# Patient Record
Sex: FEMALE | Race: BLACK OR AFRICAN AMERICAN | Employment: UNEMPLOYED | URBAN - METROPOLITAN AREA
[De-identification: names, ages, dates, MRNs, and addresses within clinical notes are randomized per-mention and may not be internally consistent; named-entity substitution may affect disease eponyms.]

---

## 2018-06-06 LAB — MAMMOGRAPHY, EXTERNAL: NORMAL

## 2019-11-23 LAB
HBA1C MFR BLD HPLC: 7.5 %
LDL-C, EXTERNAL: 180

## 2020-01-13 ENCOUNTER — OFFICE VISIT (OUTPATIENT)
Dept: FAMILY MEDICINE CLINIC | Age: 50
End: 2020-01-13

## 2020-01-13 VITALS
HEART RATE: 77 BPM | SYSTOLIC BLOOD PRESSURE: 121 MMHG | BODY MASS INDEX: 35.82 KG/M2 | OXYGEN SATURATION: 96 % | HEIGHT: 65 IN | DIASTOLIC BLOOD PRESSURE: 87 MMHG | WEIGHT: 215 LBS | RESPIRATION RATE: 16 BRPM | TEMPERATURE: 98 F

## 2020-01-13 DIAGNOSIS — G89.29 OTHER CHRONIC PAIN: ICD-10-CM

## 2020-01-13 DIAGNOSIS — I10 ESSENTIAL HYPERTENSION: ICD-10-CM

## 2020-01-13 DIAGNOSIS — Z12.31 VISIT FOR SCREENING MAMMOGRAM: ICD-10-CM

## 2020-01-13 DIAGNOSIS — E11.9 TYPE 2 DIABETES MELLITUS WITHOUT COMPLICATION, WITHOUT LONG-TERM CURRENT USE OF INSULIN (HCC): Primary | ICD-10-CM

## 2020-01-13 DIAGNOSIS — E66.01 SEVERE OBESITY (HCC): ICD-10-CM

## 2020-01-13 RX ORDER — ROSUVASTATIN CALCIUM 20 MG/1
20 TABLET, COATED ORAL
COMMUNITY
End: 2020-02-11 | Stop reason: SDUPTHER

## 2020-01-13 RX ORDER — ESCITALOPRAM OXALATE 20 MG/1
20 TABLET ORAL DAILY
COMMUNITY
End: 2020-07-14 | Stop reason: SDUPTHER

## 2020-01-13 RX ORDER — LISINOPRIL AND HYDROCHLOROTHIAZIDE 20; 25 MG/1; MG/1
TABLET ORAL DAILY
COMMUNITY
End: 2020-12-03 | Stop reason: SDUPTHER

## 2020-01-13 NOTE — PATIENT INSTRUCTIONS
Body Mass Index: Care Instructions  Your Care Instructions    Body mass index (BMI) can help you see if your weight is raising your risk for health problems. It uses a formula to compare how much you weigh with how tall you are. · A BMI lower than 18.5 is considered underweight. · A BMI between 18.5 and 24.9 is considered healthy. · A BMI between 25 and 29.9 is considered overweight. A BMI of 30 or higher is considered obese. If your BMI is in the normal range, it means that you have a lower risk for weight-related health problems. If your BMI is in the overweight or obese range, you may be at increased risk for weight-related health problems, such as high blood pressure, heart disease, stroke, arthritis or joint pain, and diabetes. If your BMI is in the underweight range, you may be at increased risk for health problems such as fatigue, lower protection (immunity) against illness, muscle loss, bone loss, hair loss, and hormone problems. BMI is just one measure of your risk for weight-related health problems. You may be at higher risk for health problems if you are not active, you eat an unhealthy diet, or you drink too much alcohol or use tobacco products. Follow-up care is a key part of your treatment and safety. Be sure to make and go to all appointments, and call your doctor if you are having problems. It's also a good idea to know your test results and keep a list of the medicines you take. How can you care for yourself at home? · Practice healthy eating habits. This includes eating plenty of fruits, vegetables, whole grains, lean protein, and low-fat dairy. · If your doctor recommends it, get more exercise. Walking is a good choice. Bit by bit, increase the amount you walk every day. Try for at least 30 minutes on most days of the week. · Do not smoke. Smoking can increase your risk for health problems. If you need help quitting, talk to your doctor about stop-smoking programs and medicines. These can increase your chances of quitting for good. · Limit alcohol to 2 drinks a day for men and 1 drink a day for women. Too much alcohol can cause health problems. If you have a BMI higher than 25  · Your doctor may do other tests to check your risk for weight-related health problems. This may include measuring the distance around your waist. A waist measurement of more than 40 inches in men or 35 inches in women can increase the risk of weight-related health problems. · Talk with your doctor about steps you can take to stay healthy or improve your health. You may need to make lifestyle changes to lose weight and stay healthy, such as changing your diet and getting regular exercise. If you have a BMI lower than 18.5  · Your doctor may do other tests to check your risk for health problems. · Talk with your doctor about steps you can take to stay healthy or improve your health. You may need to make lifestyle changes to gain or maintain weight and stay healthy, such as getting more healthy foods in your diet and doing exercises to build muscle. Where can you learn more? Go to http://russell-alli.info/. Enter S176 in the search box to learn more about \"Body Mass Index: Care Instructions. \"  Current as of: October 13, 2016  Content Version: 11.4  © 4702-0786 Healthwise, [employer]. Care instructions adapted under license by [employer] (which disclaims liability or warranty for this information). If you have questions about a medical condition or this instruction, always ask your healthcare professional. [address] any warranty or liability for your use of this information.

## 2020-01-13 NOTE — PROGRESS NOTES
Chief Complaint   Patient presents with   [facility]     Pt reports that she has chronic abdominal pain from previous hernia surgeries with mesh that did not go well. Pt reports that when she was taking off her pain medication by another provider she started smoking weed. Pt reports that she had her medical marijuana card in [place]. Pt reports that it provides significant relief. Pt reports that she has a history of blood clots in her neck from a port a cath. Pt has diabetes, high blood pressure. Pt has labs done last month at Dr. Kourtney Holliday office. Subjective: (As above and below)     Chief Complaint   Patient presents with   [address]     she is a 47y.o. year old female who presents for evaluation. Reviewed PmHx, RxHx, FmHx, SocHx, AllgHx and updated in chart. Review of Systems - negative except as listed above    Objective:     Vitals:    01/13/20 1436   BP: 121/87   Pulse: 77   Resp: 16   Temp: 98 °F (36.7 °C)   TempSrc: Oral   SpO2: 96%   Weight: 215 lb (97.5 kg)   Height: 5' 5\" (1.651 m)     Physical Examination: General appearance - alert, well appearing, and in no distress  Mental status - normal mood, behavior, speech, dress, motor activity, and thought processes  Mouth - mucous membranes moist, pharynx normal without lesions  Chest - clear to auscultation, no wheezes, rales or rhonchi, symmetric air entry  Heart - normal rate, regular rhythm, normal S1, S2, no murmurs, rubs, clicks or gallops  Musculoskeletal - no joint tenderness, deformity or swelling  Extremities - peripheral pulses normal, no pedal edema, no clubbing or cyanosis    Assessment/ Plan:   1. Severe obesity ([address]  -continue to work on diet and exercise    2. Type 2 diabetes mellitus without complication, without long-term current use of insulin ([facility])  - glucose blood VI test strips (TRUE METRIX GLUCOSE TEST STRIP) strip; Please check blood sugar once daily, E11.9  Dispense: 100 Strip; Refill: 11    3. Essential hypertension  -well controlled    4. Other chronic pain  -managed by marijuana and gabapentin    5. Visit for screening mammogram  - [facility] 3D ZEN W MAMMO BI SCREENING INCL CAD; Future       I have discussed the diagnosis with the patient and the intended plan as seen in the above orders. The patient has received an after-visit summary and questions were answered concerning future plans. Medication Side Effects and Warnings were discussed with patient: yes  Patient Labs were reviewed: yes  Patient Past Records were reviewed:  yes    Roman Melara M.D. Discussed the patient's BMI with her. The BMI follow up plan is as follows:     dietary management education, guidance, and counseling  encourage exercise  monitor weight  prescribed dietary intake    An After Visit Summary was printed and given to the patient.

## 2020-01-13 NOTE — PROGRESS NOTES
Chief Complaint   Patient presents with   [facility] Maintenance reviewed     1. Have you been to the ER, urgent care clinic since your last visit? Hospitalized since your last visit? 2. Have you seen or consulted any other health care providers outside of the [address] [place] [address] since your last visit? Include any pap smears or colon screening.

## 2020-01-13 NOTE — PROGRESS NOTES
Chief Complaint   Patient presents with   [facility]     1. Have you been to the ER, urgent care clinic since your last visit? Hospitalized since your last visit? Yes When: [facility] 12-21    2. Have you seen or consulted any other health care providers outside of the [address] [place] [address] since your last visit? Include any pap smears or colon screening.  No    Health Maintenance Due   Topic Date Due    Pneumococcal 0-64 years (1 of 1 - PPSV23) 08/08/1975    DTaP/Tdap/Td series (1 - Tdap) 08/08/1980    PAP AKA CERVICAL CYTOLOGY  08/08/1990    Shingrix Vaccine Age 50> (1 of 2) 08/08/2019    BREAST CANCER SCRN MAMMOGRAM  08/08/2019    FOBT Q 1 YEAR AGE 50-75  08/08/2019

## 2020-01-18 DIAGNOSIS — N64.4 BREAST PAIN: ICD-10-CM

## 2020-01-18 DIAGNOSIS — Z12.31 VISIT FOR SCREENING MAMMOGRAM: Primary | ICD-10-CM

## 2020-01-27 ENCOUNTER — HOSPITAL ENCOUNTER (OUTPATIENT)
Dept: MAMMOGRAPHY | Age: 50
Discharge: HOME | End: 2020-01-27
Attending: FAMILY MEDICINE

## 2020-01-27 DIAGNOSIS — N64.4 BREAST PAIN: ICD-10-CM

## 2020-01-27 DIAGNOSIS — Z12.31 VISIT FOR SCREENING MAMMOGRAM: ICD-10-CM

## 2020-02-11 RX ORDER — ROSUVASTATIN CALCIUM 20 MG/1
20 TABLET, COATED ORAL
Qty: 90 TAB | Refills: 3 | Status: SHIPPED | OUTPATIENT
Start: 2020-02-11 | End: 2020-02-18 | Stop reason: SDUPTHER

## 2020-02-18 RX ORDER — ROSUVASTATIN CALCIUM 20 MG/1
20 TABLET, COATED ORAL
Qty: 90 TAB | Refills: 3 | Status: SHIPPED | OUTPATIENT
Start: 2020-02-18 | End: 2021-08-30

## 2020-02-18 NOTE — TELEPHONE ENCOUNTER
Pt is calling requesting a refill on her med    Requested Prescriptions     Pending Prescriptions Disp Refills    rosuvastatin (CRESTOR) 20 mg tablet 90 Tab 3     Sig: Take 1 Tab by mouth nightly.

## 2020-02-25 ENCOUNTER — TELEPHONE (OUTPATIENT)
Dept: FAMILY MEDICINE CLINIC | Age: 50
End: 2020-02-25

## 2020-02-25 NOTE — TELEPHONE ENCOUNTER
Johnny Burkett [phone]     Dx mammogram scheduled but pt has been experiencing all over pain for a year now and she states that dx mammogram is not going to reveal anything new. She would like to know if it is okay for pt to have screening mammogram done instead? Called and informed Carlos Cummins that per Verbal order by Dr. Ana Smith she states it is fine to do this.

## 2020-02-26 ENCOUNTER — HOSPITAL ENCOUNTER (OUTPATIENT)
Dept: MAMMOGRAPHY | Age: 50
Discharge: HOME | End: 2020-02-26
Attending: FAMILY MEDICINE
Payer: MEDICAID

## 2020-02-26 DIAGNOSIS — Z12.31 VISIT FOR SCREENING MAMMOGRAM: ICD-10-CM

## 2020-02-26 PROCEDURE — 77063 BREAST TOMOSYNTHESIS BI: CPT

## 2020-03-06 DIAGNOSIS — G89.29 OTHER CHRONIC PAIN: Primary | ICD-10-CM

## 2020-03-06 NOTE — TELEPHONE ENCOUNTER
Requested Prescriptions     Pending Prescriptions Disp Refills    gabapentin 300 mg Tb24       Sig: Take  by mouth.      [facility]

## 2020-03-30 ENCOUNTER — DOCUMENTATION ONLY (OUTPATIENT)
Dept: FAMILY MEDICINE CLINIC | Age: 50
End: 2020-03-30

## 2020-03-30 NOTE — PROGRESS NOTES
Pharmacy called requesting rx for gabapentin to be changed from tabs to caps. Per Dr. Seaman Friday given.

## 2020-03-30 NOTE — TELEPHONE ENCOUNTER
See (on disclosure) calling in for refill to go to [facility]. She is down to 3 days worth of med. Leslee Long can be reached at [phone].  patient can be reached at [phone]

## 2020-03-31 RX ORDER — METFORMIN HYDROCHLORIDE 500 MG/1
1000 TABLET, EXTENDED RELEASE ORAL 2 TIMES DAILY
Qty: 120 TAB | Refills: 10 | Status: SHIPPED | OUTPATIENT
Start: 2020-03-31 | End: 2021-07-13

## 2020-05-07 ENCOUNTER — VIRTUAL VISIT (OUTPATIENT)
Dept: FAMILY MEDICINE CLINIC | Age: 50
End: 2020-05-07

## 2020-05-07 VITALS — HEIGHT: 65 IN | BODY MASS INDEX: 36.65 KG/M2 | WEIGHT: 220 LBS

## 2020-05-07 DIAGNOSIS — E66.01 SEVERE OBESITY (HCC): ICD-10-CM

## 2020-05-07 DIAGNOSIS — E11.40 TYPE 2 DIABETES MELLITUS WITH DIABETIC NEUROPATHY, WITHOUT LONG-TERM CURRENT USE OF INSULIN (HCC): Primary | ICD-10-CM

## 2020-05-07 DIAGNOSIS — I10 ESSENTIAL HYPERTENSION: ICD-10-CM

## 2020-05-07 RX ORDER — GABAPENTIN 600 MG/1
600 TABLET ORAL 3 TIMES DAILY
Qty: 90 TAB | Refills: 2 | Status: SHIPPED | OUTPATIENT
Start: 2020-05-07 | End: 2020-10-11

## 2020-05-07 RX ORDER — PANTOPRAZOLE SODIUM 20 MG/1
20 TABLET, DELAYED RELEASE ORAL DAILY
COMMUNITY
End: 2021-11-16 | Stop reason: SDUPTHER

## 2020-05-07 RX ORDER — SUCRALFATE 1 G/1
1 TABLET ORAL 4 TIMES DAILY
COMMUNITY
End: 2021-11-16 | Stop reason: SDUPTHER

## 2020-05-07 NOTE — PROGRESS NOTES
Chief Complaint   Patient presents with    Diabetes     follow up    Medication Evaluation     gabapentin       1. Have you been to the ER, urgent care clinic since your last visit? Hospitalized since your last visit? [facility] [place]     2. Have you seen or consulted any other health care providers outside of the [address] [place] [address] since your last visit? Include any pap smears or colon screening.  Dr. Dorothy De, gastro-[facility]    Health Maintenance Due   Topic Date Due    Foot Exam Q1  08/08/1979    Eye Exam Retinal or Dilated  08/08/1979    PAP AKA CERVICAL CYTOLOGY  08/08/1990    FOBT Q1Y Age 50-75  08/08/2019    Shingrix Vaccine Age 50> (2 of 2) 01/03/2020

## 2020-05-07 NOTE — PROGRESS NOTES
Chief Complaint   Patient presents with    Diabetes     follow up    Medication Evaluation     gabapentin     Pt was seen today via [employer] video platform. Pt reports that she has been doing well, still struggling with chronic abdominal pain. Pt was seen in the ER, she has been working with a GI doctor as well. Pt reports that she used to be on a higher dose of gabapentin, has noticed increased pain since her dose was decreased. Pt reports that her blood sugars have been doing well, taking all of her medications as written and avoiding sweets. [facility] [address] [place] [address] for lab work. Tomas Miller is a 47 y.o. female who was seen by synchronous (real-time) audio-video technology on 5/7/2020. Consent: Tomas Miller, who was seen by synchronous (real-time) audio-video technology, and/or her healthcare decision maker, is aware that this patient-initiated, Telehealth encounter on 5/7/2020 is a billable service, with coverage as determined by her insurance carrier. She is aware that she may receive a bill and has provided verbal consent to proceed: Yes. Assessment & Plan:   1. Type 2 diabetes mellitus with diabetic neuropathy, without long-term current use of insulin (HCC)  -increase gabapentin to previous dose.   - gabapentin (NEURONTIN) 600 mg tablet; Take 1 Tab by mouth three (3) times daily for 30 days. Max Daily Amount: 1,800 mg. Dispense: 90 Tab; Refill: 2  - METABOLIC PANEL, COMPREHENSIVE; Future  - LIPID PANEL; Future  - HEMOGLOBIN A1C WITH EAG; Future    2. Essential hypertension  -no symptoms, continue on current medications,   - CBC W/O DIFF; Future  - TSH 3RD GENERATION; Future    3. Obesity  -reviewed the need to decrease sweets, pt agreed, reports that she is trying to walk more.        Subjective:   Tomas Miller is a 47 y.o. female who was seen for Diabetes (follow up) and Medication Evaluation (gabapentin)      Prior to Admission medications    Medication Sig Start Date End Date Taking? Authorizing Provider   sucralfate (CARAFATE) 1 gram tablet Take 1 g by mouth four (4) times daily. Yes Provider, Historical   pantoprazole (PROTONIX) 20 mg tablet Take 20 mg by mouth daily. Yes Provider, Historical   gabapentin (NEURONTIN) 600 mg tablet Take 1 Tab by mouth three (3) times daily for 30 days. Max Daily Amount: 1,800 mg. 5/7/20 6/6/20 Yes Andrew Bains MD   metFORMIN ER (GLUCOPHAGE XR) 500 mg tablet Take 2 Tabs by mouth two (2) times a day. 3/31/20  Yes Andrew Bains MD   rosuvastatin (CRESTOR) 20 mg tablet Take 1 Tab by mouth nightly. 2/18/20  Yes Andrew Bains MD   escitalopram oxalate (LEXAPRO) 20 mg tablet Take 20 mg by mouth daily. Yes Provider, Historical   lisinopril-hydroCHLOROthiazide (PRINZIDE, ZESTORETIC) 20-25 mg per tablet Take  by mouth daily. Yes Provider, Historical   glucose blood VI test strips (TRUE METRIX GLUCOSE TEST STRIP) strip Please check blood sugar once daily, E11.9 1/13/20  Yes Andrew Bains MD   gabapentin 300 mg Tb24 Take 300 mg by mouth daily.  Max Daily Amount: 300 mg. 3/6/20 5/7/20  Andrew Bains MD     No Known Allergies    Patient Active Problem List   Diagnosis Code    Severe obesity ([facility] E66.01    Hypertension I10    Diabetes ([facility] E11.9    Chronic pain G89.29       ROS    Objective:   Vital Signs: (As obtained by patient/caregiver at home)  Visit Vitals  Ht 5' 5\" (1.651 m)   Wt 220 lb (99.8 kg)   LMP 02/12/2012   BMI 36.61 kg/m²        [INSTRUCTIONS:  \"[x]\" Indicates a positive item  \"[]\" Indicates a negative item  -- DELETE ALL ITEMS NOT EXAMINED]    Constitutional: [x] Appears well-developed and well-nourished [x] No apparent distress      [] Abnormal -     Mental status: [x] Alert and awake  [x] Oriented to person/place/time [x] Able to follow commands    [] Abnormal -     Eyes:   EOM    [x]  Normal    [] Abnormal -   Sclera  [x]  Normal [] Abnormal -          Discharge [x]  None visible   [] Abnormal -     HENT: [x] Normocephalic, atraumatic  [] Abnormal -   [x] Mouth/Throat: Mucous membranes are moist    External Ears [x] Normal  [] Abnormal -    Neck: [x] No visualized mass [] Abnormal -     Pulmonary/Chest: [x] Respiratory effort normal   [x] No visualized signs of difficulty breathing or respiratory distress        [] Abnormal -      Musculoskeletal:   [x] Normal gait with no signs of ataxia         [x] Normal range of motion of neck        [] Abnormal -     Neurological:        [x] No Facial Asymmetry (Cranial nerve 7 motor function) (limited exam due to video visit)          [x] No gaze palsy        [] Abnormal -          Skin:        [x] No significant exanthematous lesions or discoloration noted on facial skin         [] Abnormal -            Psychiatric:       [x] Normal Affect [] Abnormal -        [x] No Hallucinations    Other pertinent observable physical exam findings:-        We discussed the expected course, resolution and complications of the diagnosis(es) in detail. Medication risks, benefits, costs, interactions, and alternatives were discussed as indicated. I advised her to contact the office if her condition worsens, changes or fails to improve as anticipated. She expressed understanding with the diagnosis(es) and plan. Jasvir Cueto is a 47 y.o. female who was evaluated by a video visit encounter for concerns as above. Patient identification was verified prior to start of the visit. A caregiver was present when appropriate. Due to this being a TeleHealth encounter (During [facility]- public health emergency), evaluation of the following organ systems was limited: Vitals/Constitutional/EENT/Resp/CV/GI//MS/Neuro/Skin/Heme-Lymph-Imm.   Pursuant to the emergency declaration under the [facility], [address] waiver authority and the [employer] Appropri[employer] Act, this Virtual  Visit was conducted, with patient's (and/or legal guardian's) consent, to reduce the patient's risk of exposure to COVID-19 and provide necessary medical care. Services were provided through a video synchronous discussion virtually to substitute for in-person clinic visit. Patient and provider were located at their individual homes.       Michael Pena MD

## 2020-05-09 LAB
ALBUMIN SERPL-MCNC: 4.6 G/DL (ref 3.8–4.8)
ALBUMIN/GLOB SERPL: 2 {RATIO} (ref 1.2–2.2)
ALP SERPL-CCNC: 74 IU/L (ref 39–117)
ALT SERPL-CCNC: 35 IU/L (ref 0–32)
AST SERPL-CCNC: 22 IU/L (ref 0–40)
BILIRUB SERPL-MCNC: 0.4 MG/DL (ref 0–1.2)
BUN SERPL-MCNC: 9 MG/DL (ref 6–24)
BUN/CREAT SERPL: 11 (ref 9–23)
CALCIUM SERPL-MCNC: 9.7 MG/DL (ref 8.7–10.2)
CHLORIDE SERPL-SCNC: 100 MMOL/L (ref 96–106)
CHOLEST SERPL-MCNC: 165 MG/DL (ref 100–199)
CO2 SERPL-SCNC: 25 MMOL/L (ref 20–29)
CREAT SERPL-MCNC: 0.79 MG/DL (ref 0.57–1)
ERYTHROCYTE [DISTWIDTH] IN BLOOD BY AUTOMATED COUNT: 12.6 % (ref 11.7–15.4)
EST. AVERAGE GLUCOSE BLD GHB EST-MCNC: 226 MG/DL
GLOBULIN SER CALC-MCNC: 2.3 G/DL (ref 1.5–4.5)
GLUCOSE SERPL-MCNC: 221 MG/DL (ref 65–99)
HBA1C MFR BLD: 9.5 % (ref 4.8–5.6)
HCT VFR BLD AUTO: 39 % (ref 34–46.6)
HDLC SERPL-MCNC: 36 MG/DL
HGB BLD-MCNC: 13.2 G/DL (ref 11.1–15.9)
INTERPRETATION, 910389: NORMAL
LDLC SERPL CALC-MCNC: 94 MG/DL (ref 0–99)
Lab: NORMAL
MCH RBC QN AUTO: 29.1 PG (ref 26.6–33)
MCHC RBC AUTO-ENTMCNC: 33.8 G/DL (ref 31.5–35.7)
MCV RBC AUTO: 86 FL (ref 79–97)
PLATELET # BLD AUTO: 232 X10E3/UL (ref 150–450)
POTASSIUM SERPL-SCNC: 3.9 MMOL/L (ref 3.5–5.2)
PROT SERPL-MCNC: 6.9 G/DL (ref 6–8.5)
RBC # BLD AUTO: 4.53 X10E6/UL (ref 3.77–5.28)
SODIUM SERPL-SCNC: 141 MMOL/L (ref 134–144)
TRIGL SERPL-MCNC: 176 MG/DL (ref 0–149)
TSH SERPL DL<=0.005 MIU/L-ACNC: 1.19 UIU/ML (ref 0.45–4.5)
VLDLC SERPL CALC-MCNC: 35 MG/DL (ref 5–40)
WBC # BLD AUTO: 8.4 X10E3/UL (ref 3.4–10.8)

## 2020-05-11 RX ORDER — GLIPIZIDE 5 MG/1
5 TABLET ORAL 2 TIMES DAILY
Qty: 180 TAB | Refills: 1 | Status: SHIPPED | OUTPATIENT
Start: 2020-05-11 | End: 2020-08-31

## 2020-05-11 NOTE — PROGRESS NOTES
Called pt, verified name and . Informed pt that per Dr. Eliza Perez cholesterol came back looking pretty good. Your triglycerides are slightly elevated. Blood sugars have significantly increased. Recommend adding additional medication. All other labs are within normal limits. Pt stated understanding and agrees to starting additional med. Patricia Krishnamurthy

## 2020-05-11 NOTE — PROGRESS NOTES
Your cholesterol came back looking pretty good. Your triglycerides are slightly elevated. The best way to bring that number down is to incorporate more omega 3's into your diet. Here are some suggestions on how to do that:  - eat 2-3 serving of fish like salmon and trout per week  - eat lots of fresh dark leafy green vegetables  - incorporate more garlic into your diet  Blood sugars have significantly increased. Recommend adding additional medication, please advise if pt agrees. All other labs are within normal limits. Please inform.

## 2020-07-14 NOTE — TELEPHONE ENCOUNTER
[facility] is requesting a refill on med    Requested Prescriptions     Pending Prescriptions Disp Refills    escitalopram oxalate (Lexapro) 20 mg tablet        Sig: Take 1 Tab by mouth daily.

## 2020-07-15 RX ORDER — ESCITALOPRAM OXALATE 20 MG/1
20 TABLET ORAL DAILY
Qty: 90 TAB | Refills: 1 | Status: SHIPPED | OUTPATIENT
Start: 2020-07-15 | End: 2021-05-03

## 2020-08-31 RX ORDER — GLIPIZIDE 5 MG/1
TABLET ORAL
Qty: 180 TAB | Refills: 0 | Status: SHIPPED | OUTPATIENT
Start: 2020-08-31 | End: 2021-08-16 | Stop reason: SDUPTHER

## 2020-12-08 ENCOUNTER — VIRTUAL VISIT (OUTPATIENT)
Dept: FAMILY MEDICINE CLINIC | Age: 50
End: 2020-12-08
Payer: MEDICAID

## 2020-12-08 DIAGNOSIS — R05.9 COUGH: Primary | ICD-10-CM

## 2020-12-08 PROCEDURE — 99213 OFFICE O/P EST LOW 20 MIN: CPT | Performed by: FAMILY MEDICINE

## 2020-12-08 NOTE — PROGRESS NOTES
1. Have you been to the ER, urgent care clinic since your last visit? Hospitalized since your last visit? No    2. Have you seen or consulted any other health care providers outside of the [address] [place] [address] since your last visit? Include any pap smears or colon screening.  No     Health Maintenance Due   Topic Date Due    Foot Exam Q1  08/08/1979    Eye Exam Retinal or Dilated  08/08/1979    PAP AKA CERVICAL CYTOLOGY  08/08/1990    Colorectal Cancer Screening Combo  08/08/2019    Shingrix Vaccine Age 50> (2 of 2) 01/03/2020    A1C test (Diabetic or Prediabetic)  08/08/2020    Flu Vaccine (1) 09/01/2020    MICROALBUMIN Q1  11/23/2020

## 2020-12-08 NOTE — PROGRESS NOTES
Chief Complaint   Patient presents with    Medication Evaluation    Concern For COVID-19 (Coronavirus)     Pt was evaluated via video visit. Patient reports that her daughter tested positive for COVID, daughter was sent home from school and was rapid positive at urgent care last night. Patient reports that she has some cough, and fatigue. Patient has had close contact with her daughter. No shortness of breath, no chills, no fever, no change in taste or smell. Bisi Boo is a 45 y.o. female who was seen by synchronous (real-time) audio-video technology on 12/8/2020 for Medication Evaluation and Concern For COVID-19 (Coronavirus)        Assessment & Plan:   Diagnoses and all orders for this visit:    1. Cough  -     NOVEL CORONAVIRUS (COVID-19)    Patient will be coming up to the office with her other family members for car side testing due to close exposure to positive patient. Reinforced the need for isolation, health department has already been in touch with patient regarding sick family member. Reviewed reasons to seek further medical attention and/or to notify our office immediate    Subjective:       Prior to Admission medications    Medication Sig Start Date End Date Taking? Authorizing Provider   lisinopril-hydroCHLOROthiazide (PRINZIDE, ZESTORETIC) 20-25 mg per tablet Take 1 Tab by mouth daily. 12/4/20  Yes David Bains MD   gabapentin (NEURONTIN) 600 mg tablet TAKE 1 TABLET BY MOUTH THREE TIMES DAILY FOR 30 DAYS. MAX DAILY AMOUNT: 1,800 MG. 10/11/20  Yes David Bains MD   glipiZIDE (GLUCOTROL) 5 mg tablet TAKE ONE TABLET BY MOUTH TWICE A DAY 8/31/20  Yes David Bains MD   escitalopram oxalate (Lexapro) 20 mg tablet Take 1 Tab by mouth daily. 7/15/20  Yes David Bains MD   pantoprazole (PROTONIX) 20 mg tablet Take 20 mg by mouth daily.    Yes Provider, Historical   metFORMIN ER (GLUCOPHAGE XR) 500 mg tablet Take 2 Tabs by mouth two (2) times a day. 3/31/20  Yes Joel Bains MD   rosuvastatin (CRESTOR) 20 mg tablet Take 1 Tab by mouth nightly. 2/18/20  Yes Joel Bains MD   glucose blood VI test strips (TRUE METRIX GLUCOSE TEST STRIP) strip Please check blood sugar once daily, E11.9 1/13/20  Yes Joel Bains MD   sucralfate (CARAFATE) 1 gram tablet Take 1 g by mouth four (4) times daily. Provider, Historical     Patient Active Problem List   Diagnosis Code    Severe obesity ([facility] E66.01    Hypertension I10    Diabetes ([facility] E11.9    Chronic pain G89.29       Review of Systems   Constitutional: Positive for malaise/fatigue. Negative for chills and fever. HENT: Negative for congestion and sore throat. Respiratory: Positive for cough. Negative for shortness of breath. Cardiovascular: Negative for chest pain and palpitations. Gastrointestinal: Negative for abdominal pain, heartburn, nausea and vomiting. Genitourinary: Negative for dysuria and urgency. Musculoskeletal: Negative for joint pain and myalgias. Neurological: Negative for dizziness, tingling and headaches. All other systems reviewed and are negative. Objective:   No flowsheet data found.      [INSTRUCTIONS:  \"[x]\" Indicates a positive item  \"[]\" Indicates a negative item  -- DELETE ALL ITEMS NOT EXAMINED]    Constitutional: [x] Appears well-developed and well-nourished [x] No apparent distress      [] Abnormal -     Mental status: [x] Alert and awake  [x] Oriented to person/place/time [x] Able to follow commands    [] Abnormal -     Eyes:   EOM    [x]  Normal    [] Abnormal -   Sclera  [x]  Normal    [] Abnormal -          Discharge [x]  None visible   [] Abnormal -     HENT: [x] Normocephalic, atraumatic  [] Abnormal -   [x] Mouth/Throat: Mucous membranes are moist    External Ears [x] Normal  [] Abnormal -    Neck: [x] No visualized mass [] Abnormal -     Pulmonary/Chest: [x] Respiratory effort normal   [x] No visualized signs of difficulty breathing or respiratory distress        [] Abnormal -      Musculoskeletal:   [x] Normal gait with no signs of ataxia         [x] Normal range of motion of neck        [] Abnormal -     Neurological:        [x] No Facial Asymmetry (Cranial nerve 7 motor function) (limited exam due to video visit)          [x] No gaze palsy        [] Abnormal -          Skin:        [x] No significant exanthematous lesions or discoloration noted on facial skin         [] Abnormal -            Psychiatric:       [x] Normal Affect [] Abnormal -        [x] No Hallucinations    Other pertinent observable physical exam findings:-        We discussed the expected course, resolution and complications of the diagnosis(es) in detail. Medication risks, benefits, costs, interactions, and alternatives were discussed as indicated. I advised her to contact the office if her condition worsens, changes or fails to improve as anticipated. She expressed understanding with the diagnosis(es) and plan. Tino Kim, who was evaluated through a patient-initiated, synchronous (real-time) audio-video encounter, and/or her healthcare decision maker, is aware that it is a billable service, with coverage as determined by her insurance carrier. She provided verbal consent to proceed: Yes, and patient identification was verified. It was conducted pursuant to the emergency declaration under the [facility], [address] [place] [address] authority and the [employer] Act. A caregiver was present when appropriate. Ability to conduct physical exam was limited. I was in the office. The patient was at home.       Vonnie Welch MD

## 2020-12-10 DIAGNOSIS — E11.40 TYPE 2 DIABETES MELLITUS WITH DIABETIC NEUROPATHY, WITHOUT LONG-TERM CURRENT USE OF INSULIN (HCC): ICD-10-CM

## 2020-12-10 LAB — SARS-COV-2, NAA: NOT DETECTED

## 2020-12-10 RX ORDER — GABAPENTIN 600 MG/1
TABLET ORAL
Qty: 90 TAB | Refills: 2 | Status: SHIPPED | OUTPATIENT
Start: 2020-12-10 | End: 2021-07-13

## 2020-12-10 NOTE — TELEPHONE ENCOUNTER
Patient hasn't received her Gabapentin yet from Dr. Kelly Olvera. She already had a virtual visit with Dr. Kelly Olvera. Patient is requesting for another provider can prescribe this. She needs asap.

## 2021-05-03 RX ORDER — ESCITALOPRAM OXALATE 20 MG/1
20 TABLET ORAL DAILY
Qty: 90 TAB | Refills: 0 | Status: SHIPPED | OUTPATIENT
Start: 2021-05-03 | End: 2021-10-01

## 2021-08-16 ENCOUNTER — CLINICAL SUPPORT (OUTPATIENT)
Dept: FAMILY MEDICINE CLINIC | Age: 51
End: 2021-08-16
Payer: MEDICAID

## 2021-08-16 VITALS
HEIGHT: 65 IN | OXYGEN SATURATION: 96 % | BODY MASS INDEX: 36.65 KG/M2 | TEMPERATURE: 98.1 F | HEART RATE: 79 BPM | RESPIRATION RATE: 16 BRPM | DIASTOLIC BLOOD PRESSURE: 84 MMHG | WEIGHT: 220 LBS | SYSTOLIC BLOOD PRESSURE: 116 MMHG

## 2021-08-16 DIAGNOSIS — E11.40 TYPE 2 DIABETES MELLITUS WITH DIABETIC NEUROPATHY, WITHOUT LONG-TERM CURRENT USE OF INSULIN (HCC): ICD-10-CM

## 2021-08-16 DIAGNOSIS — I10 ESSENTIAL HYPERTENSION: Primary | ICD-10-CM

## 2021-08-16 LAB — HBA1C MFR BLD HPLC: 10.8 % (ref 4.8–5.6)

## 2021-08-16 PROCEDURE — 83036 HEMOGLOBIN GLYCOSYLATED A1C: CPT | Performed by: FAMILY MEDICINE

## 2021-08-16 PROCEDURE — 99214 OFFICE O/P EST MOD 30 MIN: CPT | Performed by: FAMILY MEDICINE

## 2021-08-16 RX ORDER — METFORMIN HYDROCHLORIDE 500 MG/1
1000 TABLET, EXTENDED RELEASE ORAL 2 TIMES DAILY
Qty: 120 TABLET | Refills: 5 | Status: SHIPPED | OUTPATIENT
Start: 2021-08-16 | End: 2021-11-16 | Stop reason: SDUPTHER

## 2021-08-16 RX ORDER — GLIPIZIDE 10 MG/1
10 TABLET ORAL 2 TIMES DAILY
Qty: 90 TABLET | Refills: 1 | Status: SHIPPED | OUTPATIENT
Start: 2021-08-16 | End: 2021-11-16 | Stop reason: SDUPTHER

## 2021-08-16 NOTE — PROGRESS NOTES
Chief Complaint   Patient presents with    Diabetes     A1C check     Pt is here today for diabetes check, POC A1c check was over 10. Pt repots that she is taking medications as written, no symptoms of high blood sugar. Pt is overdue for an eye exam.     Last PAP is unknown, pt will schedule. Subjective: (As above and below)     Chief Complaint   Patient presents with    Diabetes     A1C check     she is a 45y.o. year old female who presents for evaluation. Reviewed PmHx, RxHx, FmHx, SocHx, AllgHx and updated in chart. Review of Systems - negative except as listed above    Objective:     Vitals:    08/16/21 1001   BP: 116/84   Pulse: 79   Resp: 16   Temp: 98.1 °F (36.7 °C)   TempSrc: Oral   SpO2: 96%   Weight: 220 lb (99.8 kg)   Height: 5' 5\" (1.651 m)     Physical Examination: General appearance - alert, well appearing, and in no distress  Mental status - normal mood, behavior, speech, dress, motor activity, and thought processes  Ears - bilateral TM's and external ear canals normal  Chest - clear to auscultation, no wheezes, rales or rhonchi, symmetric air entry  Heart - normal rate, regular rhythm, normal S1, S2, no murmurs, rubs, clicks or gallops  Musculoskeletal - no joint tenderness, deformity or swelling  Extremities - peripheral pulses normal, no pedal edema, no clubbing or cyanosis    Assessment/ Plan:   1. Essential hypertension  -well controlled  - HEPATITIS C AB; Future  - HEPATITIS C AB    2. Type 2 diabetes mellitus with diabetic neuropathy, without long-term current use of insulin (HCC)  -increase metformin to 2000 daily and increase glipizide to 10 BID  - AMB POC HEMOGLOBIN A5Z  - METABOLIC PANEL, COMPREHENSIVE; Future  - CBC W/O DIFF; Future  - LIPID PANEL; Future  - HEMOGLOBIN A1C WITH EAG; Future  - TSH 3RD GENERATION; Future  - metFORMIN ER (GLUCOPHAGE XR) 500 mg tablet; Take 2 Tablets by mouth two (2) times a day. Dispense: 120 Tablet;  Refill: 5  - glipiZIDE (GLUCOTROL) 10 mg tablet; Take 1 Tablet by mouth two (2) times a day. Dispense: 90 Tablet; Refill: 1       I have discussed the diagnosis with the patient and the intended plan as seen in the above orders. The patient has received an after-visit summary and questions were answered concerning future plans.      Medication Side Effects and Warnings were discussed with patient: yes  Patient Labs were reviewed: yes  Patient Past Records were reviewed:  yes    Russell He M.D.

## 2021-08-16 NOTE — PROGRESS NOTES
1. Have you been to the ER, urgent care clinic since your last visit? Hospitalized since your last visit? No    2. Have you seen or consulted any other health care providers outside of the [address] [place] [address] since your last visit? Include any pap smears or colon screening.  No    Health Maintenance Due   Topic Date Due    Hepatitis C Screening  Never done    Foot Exam Q1  Never done    Eye Exam Retinal or Dilated  Never done    COVID-19 Vaccine (1) Never done    PAP AKA CERVICAL CYTOLOGY  Never done    Colorectal Cancer Screening Combo  Never done    MICROALBUMIN Q1  11/23/2020    A1C test (Diabetic or Prediabetic)  05/08/2021    Lipid Screen  05/08/2021     Chief Complaint   Patient presents with    Diabetes     A1C check

## 2021-08-17 LAB
ALBUMIN SERPL-MCNC: 4.7 G/DL (ref 3.8–4.9)
ALBUMIN/GLOB SERPL: 2 {RATIO} (ref 1.2–2.2)
ALP SERPL-CCNC: 86 IU/L (ref 48–121)
ALT SERPL-CCNC: 25 IU/L (ref 0–32)
AST SERPL-CCNC: 21 IU/L (ref 0–40)
BILIRUB SERPL-MCNC: 0.4 MG/DL (ref 0–1.2)
BUN SERPL-MCNC: 11 MG/DL (ref 6–24)
BUN/CREAT SERPL: 16 (ref 9–23)
CALCIUM SERPL-MCNC: 10.1 MG/DL (ref 8.7–10.2)
CHLORIDE SERPL-SCNC: 98 MMOL/L (ref 96–106)
CHOLEST SERPL-MCNC: 193 MG/DL (ref 100–199)
CO2 SERPL-SCNC: 30 MMOL/L (ref 20–29)
CREAT SERPL-MCNC: 0.67 MG/DL (ref 0.57–1)
ERYTHROCYTE [DISTWIDTH] IN BLOOD BY AUTOMATED COUNT: 12.5 % (ref 11.7–15.4)
EST. AVERAGE GLUCOSE BLD GHB EST-MCNC: 278 MG/DL
GLOBULIN SER CALC-MCNC: 2.4 G/DL (ref 1.5–4.5)
GLUCOSE SERPL-MCNC: 208 MG/DL (ref 65–99)
HBA1C MFR BLD: 11.3 % (ref 4.8–5.6)
HCT VFR BLD AUTO: 43.6 % (ref 34–46.6)
HCV AB S/CO SERPL IA: <0.1 S/CO RATIO (ref 0–0.9)
HDLC SERPL-MCNC: 40 MG/DL
HGB BLD-MCNC: 14.1 G/DL (ref 11.1–15.9)
IMP & REVIEW OF LAB RESULTS: NORMAL
LDLC SERPL CALC-MCNC: 125 MG/DL (ref 0–99)
MCH RBC QN AUTO: 29.1 PG (ref 26.6–33)
MCHC RBC AUTO-ENTMCNC: 32.3 G/DL (ref 31.5–35.7)
MCV RBC AUTO: 90 FL (ref 79–97)
PLATELET # BLD AUTO: 265 X10E3/UL (ref 150–450)
POTASSIUM SERPL-SCNC: 3.9 MMOL/L (ref 3.5–5.2)
PROT SERPL-MCNC: 7.1 G/DL (ref 6–8.5)
RBC # BLD AUTO: 4.85 X10E6/UL (ref 3.77–5.28)
SODIUM SERPL-SCNC: 139 MMOL/L (ref 134–144)
TRIGL SERPL-MCNC: 158 MG/DL (ref 0–149)
TSH SERPL DL<=0.005 MIU/L-ACNC: 2.51 UIU/ML (ref 0.45–4.5)
VLDLC SERPL CALC-MCNC: 28 MG/DL (ref 5–40)
WBC # BLD AUTO: 8.6 X10E3/UL (ref 3.4–10.8)

## 2021-08-20 NOTE — PROGRESS NOTES
Cholesterol is elevated, please confirm that pt is taking Crestor as written.    Diabetes is very poorly controlled, Please increase medications as discussed during appt  Recheck in 3 months

## 2021-08-25 NOTE — PROGRESS NOTES
Pt notified and voiced understanding, pt states she is taking her crestor everyday but she does have poor eating habits that she will stop

## 2021-09-28 ENCOUNTER — TELEPHONE (OUTPATIENT)
Dept: FAMILY MEDICINE CLINIC | Age: 51
End: 2021-09-28

## 2021-09-28 NOTE — TELEPHONE ENCOUNTER
Pt notified that we dont have any appointments available today she states she cant wait and she will just go to urgent care

## 2021-09-28 NOTE — TELEPHONE ENCOUNTER
Patient having stomach pain, was seeing if we could squeeze her in anywhere today.  Please call patient at [phone]

## 2021-10-01 RX ORDER — ESCITALOPRAM OXALATE 20 MG/1
TABLET ORAL
Qty: 90 TABLET | Refills: 0 | Status: SHIPPED | OUTPATIENT
Start: 2021-10-01 | End: 2021-11-16 | Stop reason: SDUPTHER

## 2021-10-01 RX ORDER — ROSUVASTATIN CALCIUM 20 MG/1
TABLET, COATED ORAL
Qty: 90 TABLET | Refills: 0 | Status: SHIPPED | OUTPATIENT
Start: 2021-10-01 | End: 2021-11-16 | Stop reason: SDUPTHER

## 2021-11-16 ENCOUNTER — OFFICE VISIT (OUTPATIENT)
Dept: FAMILY MEDICINE CLINIC | Age: 51
End: 2021-11-16
Payer: MEDICAID

## 2021-11-16 VITALS
OXYGEN SATURATION: 98 % | RESPIRATION RATE: 16 BRPM | HEART RATE: 73 BPM | WEIGHT: 199.6 LBS | SYSTOLIC BLOOD PRESSURE: 122 MMHG | TEMPERATURE: 98.3 F | BODY MASS INDEX: 33.26 KG/M2 | HEIGHT: 65 IN | DIASTOLIC BLOOD PRESSURE: 85 MMHG

## 2021-11-16 DIAGNOSIS — Z23 NEEDS FLU SHOT: ICD-10-CM

## 2021-11-16 DIAGNOSIS — F34.1 DYSTHYMIA: ICD-10-CM

## 2021-11-16 DIAGNOSIS — E11.40 TYPE 2 DIABETES MELLITUS WITH DIABETIC NEUROPATHY, WITHOUT LONG-TERM CURRENT USE OF INSULIN (HCC): ICD-10-CM

## 2021-11-16 DIAGNOSIS — K21.9 GASTROESOPHAGEAL REFLUX DISEASE WITHOUT ESOPHAGITIS: ICD-10-CM

## 2021-11-16 DIAGNOSIS — I10 ESSENTIAL HYPERTENSION: ICD-10-CM

## 2021-11-16 DIAGNOSIS — I10 PRIMARY HYPERTENSION: Primary | ICD-10-CM

## 2021-11-16 DIAGNOSIS — G89.29 OTHER CHRONIC PAIN: ICD-10-CM

## 2021-11-16 PROCEDURE — 99214 OFFICE O/P EST MOD 30 MIN: CPT | Performed by: FAMILY MEDICINE

## 2021-11-16 RX ORDER — LISINOPRIL AND HYDROCHLOROTHIAZIDE 20; 25 MG/1; MG/1
1 TABLET ORAL DAILY
Qty: 90 TABLET | Refills: 3 | Status: SHIPPED | OUTPATIENT
Start: 2021-11-16

## 2021-11-16 RX ORDER — PANTOPRAZOLE SODIUM 20 MG/1
20 TABLET, DELAYED RELEASE ORAL DAILY
Qty: 90 TABLET | Refills: 1 | Status: SHIPPED | OUTPATIENT
Start: 2021-11-16 | End: 2022-09-16

## 2021-11-16 RX ORDER — GLIPIZIDE 10 MG/1
10 TABLET ORAL 2 TIMES DAILY
Qty: 90 TABLET | Refills: 3 | Status: SHIPPED | OUTPATIENT
Start: 2021-11-16 | End: 2022-08-24

## 2021-11-16 RX ORDER — ROSUVASTATIN CALCIUM 20 MG/1
20 TABLET, COATED ORAL
Qty: 90 TABLET | Refills: 1 | Status: SHIPPED | OUTPATIENT
Start: 2021-11-16

## 2021-11-16 RX ORDER — ESCITALOPRAM OXALATE 20 MG/1
20 TABLET ORAL DAILY
Qty: 90 TABLET | Refills: 3 | Status: SHIPPED | OUTPATIENT
Start: 2021-11-16 | End: 2022-03-06

## 2021-11-16 RX ORDER — GABAPENTIN 600 MG/1
600 TABLET ORAL 3 TIMES DAILY
Qty: 270 TABLET | Refills: 1 | Status: SHIPPED | OUTPATIENT
Start: 2021-11-16 | End: 2022-06-20

## 2021-11-16 RX ORDER — METFORMIN HYDROCHLORIDE 500 MG/1
1000 TABLET, EXTENDED RELEASE ORAL 2 TIMES DAILY
Qty: 360 TABLET | Refills: 1 | Status: SHIPPED | OUTPATIENT
Start: 2021-11-16 | End: 2022-04-29

## 2021-11-16 RX ORDER — SUCRALFATE 1 G/1
1 TABLET ORAL 4 TIMES DAILY
Qty: 360 TABLET | Refills: 1 | Status: SHIPPED | OUTPATIENT
Start: 2021-11-16 | End: 2022-02-14

## 2021-11-16 NOTE — PROGRESS NOTES
Chief Complaint   Patient presents with    Diabetes     3 month f/u     Pt is fasting this morning. Pt reports that she is taking all medications as written. Feels good, other than increased pain in her left buttock with prolonged sitting. Subjective: (As above and below)     Chief Complaint   Patient presents with    Diabetes     3 month f/u     she is a 45y.o. year old female who presents for evaluation. Reviewed PmHx, RxHx, FmHx, SocHx, AllgHx and updated in chart. Review of Systems - negative except as listed above    Objective:     Vitals:    11/16/21 0751   BP: 122/85   Pulse: 73   Resp: 16   Temp: 98.3 °F (36.8 °C)   TempSrc: Oral   SpO2: 98%   Weight: 199 lb 9.6 oz (90.5 kg)   Height: 5' 5\" (1.651 m)     Physical Examination: General appearance - alert, well appearing, and in no distress  Mental status - normal mood, behavior, speech, dress, motor activity, and thought processes  Ears - bilateral TM's and external ear canals normal  Chest - clear to auscultation, no wheezes, rales or rhonchi, symmetric air entry  Heart - normal rate, regular rhythm, normal S1, S2, no murmurs, rubs, clicks or gallops  Musculoskeletal - no joint tenderness, deformity or swelling  Extremities - peripheral pulses normal, no pedal edema, no clubbing or cyanosis    Assessment/ Plan:   1. Type 2 diabetes mellitus with diabetic neuropathy, without long-term current use of insulin (HCC)  -check fasting labs  -hopefully will see significant improvement in labs  - gabapentin (NEURONTIN) 600 mg tablet; Take 1 Tablet by mouth three (3) times daily. Max Daily Amount: 1,800 mg. TAKE 1 TABLET BY MOUTH THREE TIMES DAILY FOR 30 DAYS. MAX DAILY AMOUNT: 1,800 MG. Dispense: 270 Tablet; Refill: 1  - glipiZIDE (GLUCOTROL) 10 mg tablet; Take 1 Tablet by mouth two (2) times a day. Dispense: 90 Tablet; Refill: 3  - metFORMIN ER (GLUCOPHAGE XR) 500 mg tablet; Take 2 Tablets by mouth two (2) times a day.   Dispense: 360 Tablet; Refill: 1  - rosuvastatin (CRESTOR) 20 mg tablet; Take 1 Tablet by mouth nightly. Dispense: 90 Tablet; Refill: 1  - METABOLIC PANEL, COMPREHENSIVE; Future  - LIPID PANEL; Future  - HEMOGLOBIN A1C WITH EAG; Future  - MICROALBUMIN, UR, RAND W/ MICROALB/CREAT RATIO; Future  - METABOLIC PANEL, COMPREHENSIVE  - LIPID PANEL  - HEMOGLOBIN A1C WITH EAG  - MICROALBUMIN, UR, RAND W/ MICROALB/CREAT RATIO    2. Primary hypertension  -well controlled  - lisinopril-hydroCHLOROthiazide (PRINZIDE, ZESTORETIC) 20-25 mg per tablet; Take 1 Tablet by mouth daily. Dispense: 90 Tablet; Refill: 3  - CBC W/O DIFF; Future  - TSH 3RD GENERATION; Future  - CBC W/O DIFF  - TSH 3RD GENERATION    3. Other chronic pain  -continue on gabapentin    4. Essential hypertension  -well controlled    5. Dysthymia  -symptoms well controlled  - escitalopram oxalate (LEXAPRO) 20 mg tablet; Take 1 Tablet by mouth daily. Dispense: 90 Tablet; Refill: 3    6. Gastroesophageal reflux disease without esophagitis  - pantoprazole (PROTONIX) 20 mg tablet; Take 1 Tablet by mouth daily. Dispense: 90 Tablet; Refill: 1  - sucralfate (CARAFATE) 1 gram tablet; Take 1 Tablet by mouth four (4) times daily for 90 days. Dispense: 360 Tablet; Refill: 1    7. Needs flu shot  - INFLUENZA VIRUS VAC QUAD,SPLIT,PRESV FREE SYRINGE IM       I have discussed the diagnosis with the patient and the intended plan as seen in the above orders. The patient has received an after-visit summary and questions were answered concerning future plans.      Medication Side Effects and Warnings were discussed with patient: yes  Patient Labs were reviewed: yes  Patient Past Records were reviewed:  yes    Marry Dumont M.D.

## 2021-11-16 NOTE — PROGRESS NOTES
1. Have you been to the ER, urgent care clinic since your last visit? Hospitalized since your last visit? Yes,[facility] about a month ago for back pains. 2. Have you seen or consulted any other health care providers outside of the [address] [place] [address] since your last visit? Include any pap smears or colon screening. No    Health Maintenance Due   Topic Date Due    Foot Exam Q1  Never done    Eye Exam Retinal or Dilated  Never done    Colorectal Cancer Screening Combo  Never done    MICROALBUMIN Q1  11/23/2020    Flu Vaccine (1) 09/01/2021    A1C test (Diabetic or Prediabetic)  11/16/2021     Chief Complaint   Patient presents with    Diabetes     3 month f/u     Pt would like Flu shot.

## 2021-11-16 NOTE — PATIENT INSTRUCTIONS
Vaccine Information Statement    Influenza (Flu) Vaccine (Inactivated or Recombinant): What You Need to Know    Many vaccine information statements are available in Portuguese and other languages. See www.immunize.org/vis. Hojas de información sobre vacunas están disponibles en español y en muchos otros idiomas. Visite www.immunize.org/vis. 1. Why get vaccinated? Influenza vaccine can prevent influenza (flu). Flu is a contagious disease that spreads around the [facility] every year, usually between October and May. Anyone can get the flu, but it is more dangerous for some people. Infants and young children, people 72 years and older, pregnant people, and people with certain health conditions or a weakened immune system are at greatest risk of flu complications. Pneumonia, bronchitis, sinus infections, and ear infections are examples of flu-related complications. If you have a medical condition, such as heart disease, cancer, or diabetes, flu can make it worse. Flu can cause fever and chills, sore throat, muscle aches, fatigue, cough, headache, and runny or stuffy nose. Some people may have vomiting and diarrhea, though this is more common in children than adults. In an average year, thousands of people in the [facility] die from flu, and many more are hospitalized. Flu vaccine prevents millions of illnesses and flu-related visits to the doctor each year. 2. Influenza vaccines     CDC recommends everyone 6 months and older get vaccinated every flu season. Children 6 months through 6years of age may need 2 doses during a single flu season. Everyone else needs only 1 dose each flu season. It takes about 2 weeks for protection to develop after vaccination. There are many flu viruses, and they are always changing. Each year a new flu vaccine is made to protect against the influenza viruses believed to be likely to cause disease in the upcoming flu season.  Even when the vaccine doesnt exactly match these viruses, it may still provide some protection. Influenza vaccine does not cause flu. Influenza vaccine may be given at the same time as other vaccines. 3. Talk with your health care provider    Tell your vaccination provider if the person getting the vaccine:   Has had an allergic reaction after a previous dose of influenza vaccine, or has any severe, life-threatening allergies    Has ever had Guillain-Barré Syndrome (also called GBS)    In some cases, your health care provider may decide to postpone influenza vaccination until a future visit. Influenza vaccine can be administered at any time during pregnancy. People who are or will be pregnant during influenza season should receive inactivated influenza vaccine. People with minor illnesses, such as a cold, may be vaccinated. People who are moderately or severely ill should usually wait until they recover before getting influenza vaccine. Your health care provider can give you more information. 4. Risks of a vaccine reaction     Soreness, redness, and swelling where the shot is given, fever, muscle aches, and headache can happen after influenza vaccination.  There may be a very small increased risk of Guillain-Barré Syndrome (GBS) after inactivated influenza vaccine (the flu shot). Trey Guerra children who get the flu shot along with pneumococcal vaccine (PCV13) and/or DTaP vaccine at the same time might be slightly more likely to have a seizure caused by fever. Tell your health care provider if a child who is getting flu vaccine has ever had a seizure. People sometimes faint after medical procedures, including vaccination. Tell your provider if you feel dizzy or have vision changes or ringing in the ears. As with any medicine, there is a very remote chance of a vaccine causing a severe allergic reaction, other serious injury, or death. 5. What if there is a serious problem?     An allergic reaction could occur after the vaccinated person leaves the clinic. If you see signs of a severe allergic reaction (hives, swelling of the face and throat, difficulty breathing, a fast heartbeat, dizziness, or weakness), call 9-1-1 and get the person to the nearest hospital.    For other signs that concern you, call your health care provider. Adverse reactions should be reported to the Vaccine Adverse Event Reporting System (VAERS). Your health care provider will usually file this report, or you can do it yourself. Visit the VAERS website at www.vaers. [facility].gov or call 4-517.300.8397. VAERS is only for reporting reactions, and VAERS staff members do not give medical advice. 6. The National Vaccine Injury Compensation Program    The [facility] Vaccine Injury Compensation Program (VICP) is a federal program that was created to compensate people who may have been injured by certain vaccines. Claims regarding alleged injury or death due to vaccination have a time limit for filing, which may be as short as two years. Visit the VICP website at www.[facility].gov/vaccinecompensation or call 1-985.432.3243 to learn about the program and about filing a claim. 7. How can I learn more?  Ask your health care provider.  Call your local or state health department.  Visit the website of the Food and Drug Administration (FDA) for vaccine package inserts and additional information at www.fda.gov/vaccines-blood-biologics/vaccines.  Contact the Centers for Disease Control and Prevention (CDC):  - Call 3-949.901.7759 (1-800-CDC-INFO) or  - Visit CDCs influenza website at www.cdc.gov/flu. Vaccine Information Statement   Inactivated Influenza Vaccine   8/6/2021  42 XIAO Joy [id number]   Department of Health and Human Services  Centers for Disease Control and Prevention    Office Use Only

## 2021-11-17 LAB
ALBUMIN SERPL-MCNC: 4.9 G/DL (ref 3.8–4.9)
ALBUMIN/CREAT UR: 10 MG/G CREAT (ref 0–29)
ALBUMIN/GLOB SERPL: 2 {RATIO} (ref 1.2–2.2)
ALP SERPL-CCNC: 82 IU/L (ref 44–121)
ALT SERPL-CCNC: 33 IU/L (ref 0–32)
AST SERPL-CCNC: 21 IU/L (ref 0–40)
BILIRUB SERPL-MCNC: 0.5 MG/DL (ref 0–1.2)
BUN SERPL-MCNC: 7 MG/DL (ref 6–24)
BUN/CREAT SERPL: 9 (ref 9–23)
CALCIUM SERPL-MCNC: 10.4 MG/DL (ref 8.7–10.2)
CHLORIDE SERPL-SCNC: 101 MMOL/L (ref 96–106)
CHOLEST SERPL-MCNC: 133 MG/DL (ref 100–199)
CO2 SERPL-SCNC: 27 MMOL/L (ref 20–29)
CREAT SERPL-MCNC: 0.8 MG/DL (ref 0.57–1)
CREAT UR-MCNC: 101.3 MG/DL
ERYTHROCYTE [DISTWIDTH] IN BLOOD BY AUTOMATED COUNT: 13.1 % (ref 11.7–15.4)
EST. AVERAGE GLUCOSE BLD GHB EST-MCNC: 171 MG/DL
GLOBULIN SER CALC-MCNC: 2.4 G/DL (ref 1.5–4.5)
GLUCOSE SERPL-MCNC: 137 MG/DL (ref 65–99)
HBA1C MFR BLD: 7.6 % (ref 4.8–5.6)
HCT VFR BLD AUTO: 42 % (ref 34–46.6)
HDLC SERPL-MCNC: 36 MG/DL
HGB BLD-MCNC: 14 G/DL (ref 11.1–15.9)
IMP & REVIEW OF LAB RESULTS: NORMAL
LDLC SERPL CALC-MCNC: 76 MG/DL (ref 0–99)
MCH RBC QN AUTO: 29.7 PG (ref 26.6–33)
MCHC RBC AUTO-ENTMCNC: 33.3 G/DL (ref 31.5–35.7)
MCV RBC AUTO: 89 FL (ref 79–97)
MICROALBUMIN UR-MCNC: 9.7 UG/ML
PLATELET # BLD AUTO: 293 X10E3/UL (ref 150–450)
POTASSIUM SERPL-SCNC: 4.5 MMOL/L (ref 3.5–5.2)
PROT SERPL-MCNC: 7.3 G/DL (ref 6–8.5)
RBC # BLD AUTO: 4.72 X10E6/UL (ref 3.77–5.28)
SODIUM SERPL-SCNC: 143 MMOL/L (ref 134–144)
TRIGL SERPL-MCNC: 112 MG/DL (ref 0–149)
TSH SERPL DL<=0.005 MIU/L-ACNC: 1.93 UIU/ML (ref 0.45–4.5)
VLDLC SERPL CALC-MCNC: 21 MG/DL (ref 5–40)
WBC # BLD AUTO: 9.2 X10E3/UL (ref 3.4–10.8)

## 2021-11-19 NOTE — PROGRESS NOTES
Calcium is slightly elevated. Diabetic control has greatly improved! Keep up the great work! Recheck labs in 3 months, including calcium recheck. All other labs are within normal limits.    Please inform

## 2022-03-05 DIAGNOSIS — F34.1 DYSTHYMIA: ICD-10-CM

## 2022-03-06 RX ORDER — ESCITALOPRAM OXALATE 20 MG/1
TABLET ORAL
Qty: 90 TABLET | Refills: 3 | Status: SHIPPED | OUTPATIENT
Start: 2022-03-06

## 2022-03-19 PROBLEM — E66.01 SEVERE OBESITY (HCC): Status: ACTIVE | Noted: 2020-01-13

## 2022-04-29 DIAGNOSIS — E11.40 TYPE 2 DIABETES MELLITUS WITH DIABETIC NEUROPATHY, WITHOUT LONG-TERM CURRENT USE OF INSULIN (HCC): ICD-10-CM

## 2022-04-29 RX ORDER — METFORMIN HYDROCHLORIDE 500 MG/1
TABLET, EXTENDED RELEASE ORAL
Qty: 360 TABLET | Refills: 0 | Status: SHIPPED | OUTPATIENT
Start: 2022-04-29 | End: 2022-07-15

## 2022-06-22 ENCOUNTER — NURSE TRIAGE (OUTPATIENT)
Dept: OTHER | Facility: CLINIC | Age: 52
End: 2022-06-22

## 2022-06-22 ENCOUNTER — TELEPHONE (OUTPATIENT)
Dept: FAMILY MEDICINE CLINIC | Age: 52
End: 2022-06-22

## 2022-06-22 NOTE — TELEPHONE ENCOUNTER
Received call from [address] at [facility] with Red Flag Complaint. Subjective: Caller states \"I am having dizzy spells and wonder if it's my sugar. \"     Current Symptoms: dizziness-very lightheaded, can't move, if I walk I would fall down. Spinning/tilting sensation. Syncopal at times. Weakness when standing. Double vision with the dizzy spells. Right hand tingles when dizzy, Is awakened at times with hand burning and has numbness/tingling. Has not had dizziness like this before. 4/10    Intermittent headache- hurts at temples  4/10    Intermittent central Chest pain- worse with coughing. Productive cough, clear to green,    SOB with activity. . Intermittent wheezing. Is able to lay flat in the bed. Constant middle abdominal pain-States where I had surgeries. Bloating,Gabapentin helps. Denies nausea, diarrhea,bleeding, ear pain    Onset: 2 weeks ago; worsening    Associated Symptoms: reduced activity, reduced fluid intake    Pain Severity: 8/10; abdominal pain    Temperature: none    What has been tried: nothing    LMP: NA Pregnant: NA    Recommended disposition: Go to ED/UCC Now (Or to Office with PCP Approval)  [address] [facility],  [address]. Spoke to Jaz. Warm transferred patient to her. Care advice provided, patient verbalizes understanding; denies any other questions or concerns; instructed to call back for any new or worsening symptoms. Attention Provider: Thank you for allowing me to participate in the care of your patient. The patient was connected to triage in response to information provided to the [facility]. Please do not respond through this encounter as the response is not directed to a shared pool.         Reason for Disposition   Loss of vision or double vision (Exception: similar to previous migraines)    Protocols used: DIZZINESS-ADULT-OH

## 2022-06-22 NOTE — TELEPHONE ENCOUNTER
verified. Rcv'd call from triage. Pt has c/o headaches, abd pain, dizziness, productive cough, and right hand numbness/pain. Denies chest pain, SOB, or double vision as reported by triage nurse. Pt states that she had not checked her BS's \"in a minute. \"  Advised pt to go to ED, but refused. She just wants to see her PCP. OV scheduled, but still advised the pt to go to the ED.

## 2022-06-28 ENCOUNTER — OFFICE VISIT (OUTPATIENT)
Dept: FAMILY MEDICINE CLINIC | Age: 52
End: 2022-06-28
Payer: MEDICAID

## 2022-06-28 VITALS
DIASTOLIC BLOOD PRESSURE: 76 MMHG | HEART RATE: 75 BPM | BODY MASS INDEX: 33.05 KG/M2 | HEIGHT: 65 IN | TEMPERATURE: 98.1 F | SYSTOLIC BLOOD PRESSURE: 108 MMHG | WEIGHT: 198.4 LBS | RESPIRATION RATE: 16 BRPM | OXYGEN SATURATION: 99 %

## 2022-06-28 DIAGNOSIS — Z23 ENCOUNTER FOR IMMUNIZATION: ICD-10-CM

## 2022-06-28 DIAGNOSIS — Z12.11 SCREENING FOR COLON CANCER: ICD-10-CM

## 2022-06-28 DIAGNOSIS — Z12.31 VISIT FOR SCREENING MAMMOGRAM: ICD-10-CM

## 2022-06-28 DIAGNOSIS — I10 PRIMARY HYPERTENSION: Primary | ICD-10-CM

## 2022-06-28 DIAGNOSIS — K21.9 GASTROESOPHAGEAL REFLUX DISEASE WITHOUT ESOPHAGITIS: ICD-10-CM

## 2022-06-28 DIAGNOSIS — E11.40 TYPE 2 DIABETES MELLITUS WITH DIABETIC NEUROPATHY, WITHOUT LONG-TERM CURRENT USE OF INSULIN (HCC): ICD-10-CM

## 2022-06-28 PROBLEM — E78.5 HYPERLIPIDEMIA: Status: ACTIVE | Noted: 2018-06-04

## 2022-06-28 PROBLEM — R10.13 EPIGASTRIC PAIN: Status: ACTIVE | Noted: 2020-03-10

## 2022-06-28 PROCEDURE — 99214 OFFICE O/P EST MOD 30 MIN: CPT | Performed by: FAMILY MEDICINE

## 2022-06-28 PROCEDURE — 90732 PPSV23 VACC 2 YRS+ SUBQ/IM: CPT | Performed by: FAMILY MEDICINE

## 2022-06-28 RX ORDER — CALCIUM CITRATE/VITAMIN D3 200MG-6.25
TABLET ORAL
COMMUNITY
End: 2022-06-28 | Stop reason: SDUPTHER

## 2022-06-28 RX ORDER — CALCIUM CITRATE/VITAMIN D3 200MG-6.25
TABLET ORAL
Qty: 200 STRIP | Refills: 11 | Status: SHIPPED | OUTPATIENT
Start: 2022-06-28

## 2022-06-28 RX ORDER — GABAPENTIN 600 MG/1
600 TABLET ORAL 3 TIMES DAILY
Qty: 270 TABLET | Refills: 1 | Status: SHIPPED | OUTPATIENT
Start: 2022-06-28 | End: 2022-08-05

## 2022-06-28 NOTE — PROGRESS NOTES
1. \"Have you been to the ER, urgent care clinic since your last visit? Hospitalized since your last visit? \" No    2. \"Have you seen or consulted any other health care providers outside of the [address] [place] [address] since your last visit? \" No    3. For patients aged 39-70: Has the patient had a colonoscopy / FIT/ Cologuard? No      If the patient is female:    4. For patients aged 41-77: Has the patient had a mammogram within the past 2 years? Yes, on file. 5. For patients aged 21-65: Has the patient had a pap smear?  No      Health Maintenance Due   Topic Date Due    Foot Exam Q1  Never done    Eye Exam Retinal or Dilated  Never done    Colorectal Cancer Screening Combo  Never done    Pneumococcal 0-64 years (2 - PCV) 04/22/2017    COVID-19 Vaccine (3 - Booster for Arneta Dun series) 10/18/2021    DTaP/Tdap/Td series (2 - Td or Tdap) 12/06/2021    Breast Cancer Screen Mammogram  02/26/2022     Chief Complaint   Patient presents with    Diabetes     follow up    Medication Refill    Dizziness     with headaches

## 2022-06-28 NOTE — PROGRESS NOTES
Chief Complaint   Patient presents with    Diabetes     follow up    Medication Refill    Dizziness     with headaches     Pt is here for diabetes follow up. Pt reports that she has been dealing with abdominal bloating since her abdominal surgeries, especially after eating. She is still have significant numbness in her hands, burning. She is overdue for an eye exam.     Pt has not been checking her sugar, is out of test strips. Continues to have trouble sleeping. Takes a sleep aid    Subjective: (As above and below)     Chief Complaint   Patient presents with    Diabetes     follow up    Medication Refill    Dizziness     with headaches     she is a 45y.o. year old female who presents for evaluation. Reviewed PmHx, RxHx, FmHx, SocHx, AllgHx and updated in chart. Review of Systems - negative except as listed above    Objective:     Vitals:    06/28/22 1039   BP: 108/76   Pulse: 75   Resp: 16   Temp: 98.1 °F (36.7 °C)   TempSrc: Oral   SpO2: 99%   Weight: 198 lb 6.4 oz (90 kg)   Height: 5' 5\" (1.651 m)     Physical Examination: General appearance - alert, well appearing, and in no distress  Mental status - normal mood, behavior, speech, dress, motor activity, and thought processes  Ears - bilateral TM's and external ear canals normal  Chest - clear to auscultation, no wheezes, rales or rhonchi, symmetric air entry  Heart - normal rate, regular rhythm, normal S1, S2, no murmurs, rubs, clicks or gallops  Musculoskeletal - no joint tenderness, deformity or swelling  Extremities - peripheral pulses normal, no pedal edema, no clubbing or cyanosis    Assessment/ Plan:   1. Type 2 diabetes mellitus with diabetic neuropathy, without long-term current use of insulin (HCC)  gabapentin refill, check labs, encouraged pt to start checking blood sugar  - gabapentin (NEURONTIN) 600 mg tablet; Take 1 Tablet by mouth three (3) times daily for 90 days. Max Daily Amount: 1,800 mg.   Dispense: 270 Tablet; Refill: 1  - METABOLIC PANEL, COMPREHENSIVE; Future  - LIPID PANEL; Future  - HEMOGLOBIN A1C WITH EAG; Future  - METABOLIC PANEL, COMPREHENSIVE  - LIPID PANEL  - HEMOGLOBIN A1C WITH EAG    2. Primary hypertension  -well controlled  - CBC W/O DIFF; Future  - TSH 3RD GENERATION; Future  - CBC W/O DIFF  - TSH 3RD GENERATION    3. Gastroesophageal reflux disease without esophagitis  - REFERRAL TO GASTROENTEROLOGY    4. Visit for screening mammogram  - [facility] MAMMO BI SCREENING INCL CAD; Future    5. Screening for colon cancer  - REFERRAL TO GASTROENTEROLOGY    6. Encounter for immunization  - PNEUMOCOCCAL, PPSV23, (AGE 2 YRS+), SC/IM       I have discussed the diagnosis with the patient and the intended plan as seen in the above orders. The patient has received an after-visit summary and questions were answered concerning future plans.      Medication Side Effects and Warnings were discussed with patient: yes  Patient Labs were reviewed: yes  Patient Past Records were reviewed:  yes    Ekaterina Campo M.D.

## 2022-06-29 LAB
ALBUMIN SERPL-MCNC: 4.8 G/DL (ref 3.8–4.9)
ALBUMIN/GLOB SERPL: 2 {RATIO} (ref 1.2–2.2)
ALP SERPL-CCNC: 77 IU/L (ref 44–121)
ALT SERPL-CCNC: 31 IU/L (ref 0–32)
AST SERPL-CCNC: 21 IU/L (ref 0–40)
BILIRUB SERPL-MCNC: 0.3 MG/DL (ref 0–1.2)
BUN SERPL-MCNC: 12 MG/DL (ref 6–24)
BUN/CREAT SERPL: 14 (ref 9–23)
CALCIUM SERPL-MCNC: 10.1 MG/DL (ref 8.7–10.2)
CHLORIDE SERPL-SCNC: 101 MMOL/L (ref 96–106)
CHOLEST SERPL-MCNC: 126 MG/DL (ref 100–199)
CO2 SERPL-SCNC: 25 MMOL/L (ref 20–29)
CREAT SERPL-MCNC: 0.87 MG/DL (ref 0.57–1)
EGFR: 80 ML/MIN/1.73
ERYTHROCYTE [DISTWIDTH] IN BLOOD BY AUTOMATED COUNT: 12.9 % (ref 11.7–15.4)
EST. AVERAGE GLUCOSE BLD GHB EST-MCNC: 186 MG/DL
GLOBULIN SER CALC-MCNC: 2.4 G/DL (ref 1.5–4.5)
GLUCOSE SERPL-MCNC: 121 MG/DL (ref 65–99)
HBA1C MFR BLD: 8.1 % (ref 4.8–5.6)
HCT VFR BLD AUTO: 40.4 % (ref 34–46.6)
HDLC SERPL-MCNC: 40 MG/DL
HGB BLD-MCNC: 13.6 G/DL (ref 11.1–15.9)
IMP & REVIEW OF LAB RESULTS: NORMAL
LDLC SERPL CALC-MCNC: 68 MG/DL (ref 0–99)
MCH RBC QN AUTO: 29.9 PG (ref 26.6–33)
MCHC RBC AUTO-ENTMCNC: 33.7 G/DL (ref 31.5–35.7)
MCV RBC AUTO: 89 FL (ref 79–97)
PLATELET # BLD AUTO: 268 X10E3/UL (ref 150–450)
POTASSIUM SERPL-SCNC: 4.4 MMOL/L (ref 3.5–5.2)
PROT SERPL-MCNC: 7.2 G/DL (ref 6–8.5)
RBC # BLD AUTO: 4.55 X10E6/UL (ref 3.77–5.28)
SODIUM SERPL-SCNC: 144 MMOL/L (ref 134–144)
TRIGL SERPL-MCNC: 95 MG/DL (ref 0–149)
TSH SERPL DL<=0.005 MIU/L-ACNC: 1.64 UIU/ML (ref 0.45–4.5)
VLDLC SERPL CALC-MCNC: 18 MG/DL (ref 5–40)
WBC # BLD AUTO: 9.9 X10E3/UL (ref 3.4–10.8)

## 2022-07-01 NOTE — PROGRESS NOTES
Blood sugars have increased, above goal.   Recommend adding a third diabetic medication to metformin and glipizide. Please advise if pt agrees. All other labs are within normal limits.    Please inform

## 2022-07-12 ENCOUNTER — HOSPITAL ENCOUNTER (OUTPATIENT)
Dept: MAMMOGRAPHY | Age: 52
Discharge: HOME | End: 2022-07-12
Attending: FAMILY MEDICINE
Payer: MEDICAID

## 2022-07-12 ENCOUNTER — TELEPHONE (OUTPATIENT)
Dept: FAMILY MEDICINE CLINIC | Age: 52
End: 2022-07-12

## 2022-07-12 DIAGNOSIS — Z12.31 VISIT FOR SCREENING MAMMOGRAM: ICD-10-CM

## 2022-07-12 PROCEDURE — 77067 SCR MAMMO BI INCL CAD: CPT

## 2022-07-15 DIAGNOSIS — E11.40 TYPE 2 DIABETES MELLITUS WITH DIABETIC NEUROPATHY, WITHOUT LONG-TERM CURRENT USE OF INSULIN (HCC): ICD-10-CM

## 2022-07-15 RX ORDER — METFORMIN HYDROCHLORIDE 500 MG/1
TABLET, EXTENDED RELEASE ORAL
Qty: 360 TABLET | Refills: 2 | Status: SHIPPED | OUTPATIENT
Start: 2022-07-15

## 2022-09-16 DIAGNOSIS — K21.9 GASTROESOPHAGEAL REFLUX DISEASE WITHOUT ESOPHAGITIS: ICD-10-CM

## 2022-09-16 RX ORDER — PANTOPRAZOLE SODIUM 20 MG/1
20 TABLET, DELAYED RELEASE ORAL DAILY
Qty: 90 TABLET | Refills: 4 | Status: SHIPPED | OUTPATIENT
Start: 2022-09-16

## 2022-11-07 DIAGNOSIS — E11.40 TYPE 2 DIABETES MELLITUS WITH DIABETIC NEUROPATHY, WITHOUT LONG-TERM CURRENT USE OF INSULIN (HCC): ICD-10-CM

## 2022-11-07 RX ORDER — ROSUVASTATIN CALCIUM 20 MG/1
20 TABLET, COATED ORAL
Qty: 90 TABLET | Refills: 1 | Status: SHIPPED | OUTPATIENT
Start: 2022-11-07

## 2022-12-11 RX ORDER — EMPAGLIFLOZIN 25 MG/1
TABLET, FILM COATED ORAL
Qty: 90 TABLET | Refills: 4 | Status: SHIPPED | OUTPATIENT
Start: 2022-12-11

## 2023-03-01 DIAGNOSIS — E11.40 TYPE 2 DIABETES MELLITUS WITH DIABETIC NEUROPATHY, WITHOUT LONG-TERM CURRENT USE OF INSULIN (HCC): ICD-10-CM

## 2023-03-01 DIAGNOSIS — K21.9 GASTROESOPHAGEAL REFLUX DISEASE WITHOUT ESOPHAGITIS: ICD-10-CM

## 2023-03-01 RX ORDER — ROSUVASTATIN CALCIUM 20 MG/1
20 TABLET, COATED ORAL
Qty: 90 TABLET | Refills: 0 | Status: SHIPPED | OUTPATIENT
Start: 2023-03-01

## 2023-03-01 RX ORDER — SUCRALFATE 1 G/1
TABLET ORAL
Qty: 360 TABLET | Refills: 0 | Status: SHIPPED | OUTPATIENT
Start: 2023-03-01

## 2023-03-06 ENCOUNTER — OFFICE VISIT (OUTPATIENT)
Dept: FAMILY MEDICINE CLINIC | Age: 53
End: 2023-03-06
Payer: MEDICAID

## 2023-03-06 VITALS
BODY MASS INDEX: 32.12 KG/M2 | OXYGEN SATURATION: 99 % | WEIGHT: 192.8 LBS | SYSTOLIC BLOOD PRESSURE: 123 MMHG | HEART RATE: 70 BPM | RESPIRATION RATE: 16 BRPM | DIASTOLIC BLOOD PRESSURE: 86 MMHG | HEIGHT: 65 IN

## 2023-03-06 DIAGNOSIS — E11.40 TYPE 2 DIABETES MELLITUS WITH DIABETIC NEUROPATHY, WITHOUT LONG-TERM CURRENT USE OF INSULIN (HCC): ICD-10-CM

## 2023-03-06 DIAGNOSIS — Z12.11 SCREEN FOR COLON CANCER: ICD-10-CM

## 2023-03-06 DIAGNOSIS — I10 PRIMARY HYPERTENSION: Primary | ICD-10-CM

## 2023-03-06 DIAGNOSIS — R14.0 ABDOMINAL BLOATING: ICD-10-CM

## 2023-03-06 DIAGNOSIS — E78.2 MIXED HYPERLIPIDEMIA: ICD-10-CM

## 2023-03-06 PROCEDURE — 3079F DIAST BP 80-89 MM HG: CPT | Performed by: FAMILY MEDICINE

## 2023-03-06 PROCEDURE — 99214 OFFICE O/P EST MOD 30 MIN: CPT | Performed by: FAMILY MEDICINE

## 2023-03-06 PROCEDURE — 3074F SYST BP LT 130 MM HG: CPT | Performed by: FAMILY MEDICINE

## 2023-03-06 RX ORDER — GABAPENTIN 600 MG/1
TABLET ORAL
Qty: 90 TABLET | Refills: 5 | Status: SHIPPED | OUTPATIENT
Start: 2023-03-06

## 2023-03-06 NOTE — PROGRESS NOTES
Chief Complaint   Patient presents with    Diabetes     Follow up     Cough     Pt reports that she has had a cough for 2 months, unchanged. She has lost 28 pounds in the past 18 months. She reports that often only eats once a day. She reports that has frequent constipation, bloating, feels a lot of upper abdominal fullness. Pt has an eye exam scheduled 4/26    Pt would prefer an alternate to colonoscopy for colon screening. Subjective: (As above and below)     Chief Complaint   Patient presents with    Diabetes     Follow up     Cough     she is a 47y.o. year old female who presents for evaluation. Reviewed PmHx, RxHx, FmHx, SocHx, AllgHx and updated in chart. Review of Systems - negative except as listed above    Objective:     Vitals:    03/06/23 0916   BP: 123/86   Pulse: 70   Resp: 16   SpO2: 99%   Weight: 192 lb 12.8 oz (87.5 kg)   Height: 5' 5\" (1.651 m)     Physical Examination: General appearance - alert, well appearing, and in no distress  Mental status - normal mood, behavior, speech, dress, motor activity, and thought processes  Ears - bilateral TM's and external ear canals normal  Chest - clear to auscultation, no wheezes, rales or rhonchi, symmetric air entry  Heart - normal rate, regular rhythm, normal S1, S2, no murmurs, rubs, clicks or gallops  Abd - upper ab fullness  Musculoskeletal - no joint tenderness, deformity or swelling  Extremities - peripheral pulses normal, no pedal edema, no clubbing or cyanosis    Assessment/ Plan:   1. Type 2 diabetes mellitus with diabetic neuropathy, without long-term current use of insulin ([facility])  -check labs, concern for poor control  - gabapentin (NEURONTIN) 600 mg tablet; TAKE 1 TABLET BY MOUTH THREE TIMES DAILY. MAX DAILY AMOUNT: 1,800 MG. Dispense: 90 Tablet; Refill: 5  - HEMOGLOBIN A1C WITH EAG; Future  - HEMOGLOBIN A1C WITH EAG  - NM GASTRIC EMPTY STDY;  Future  - MICROALBUMIN, UR, RAND W/ MICROALB/CREAT RATIO; Future  - MICROALBUMIN, UR, RAND W/ MICROALB/CREAT RATIO    2. Primary hypertension  -well controlled  - CBC W/O DIFF; Future  - TSH 3RD GENERATION; Future  - CBC W/O DIFF  - TSH 3RD GENERATION    3. Mixed hyperlipidemia  - METABOLIC PANEL, COMPREHENSIVE; Future  - LIPID PANEL; Future  - METABOLIC PANEL, COMPREHENSIVE  - LIPID PANEL    4. Abdominal bloating  - NM GASTRIC EMPTY STDY; Future    5. Screen for colon cancer  - OCCULT BLOOD IMMUNOASSAY,DIAGNOSTIC; Future  - OCCULT BLOOD IMMUNOASSAY,DIAGNOSTIC       I have discussed the diagnosis with the patient and the intended plan as seen in the above orders. The patient has received an after-visit summary and questions were answered concerning future plans.      Medication Side Effects and Warnings were discussed with patient: yes  Patient Labs were reviewed: yes  Patient Past Records were reviewed:  yes    Danielle Forde M.D.

## 2023-03-06 NOTE — PROGRESS NOTES
Chief Complaint   Patient presents with    Diabetes     Follow up     Cough     Cough started about 2 months ago, no OTC meds     Health Maintenance Due   Topic Date Due    Eye Exam Retinal or Dilated  Never done    Hepatitis B Vaccine (1 of 3 - Risk 3-dose series) Never done    Colorectal Cancer Screening Combo  Never done    COVID-19 Vaccine (4 - Booster for Moderna series) 02/18/2022    Flu Vaccine (1) 08/01/2022    Diabetic Alb to Cr ratio (uACR) test  11/16/2022       1. \"Have you been to the ER, urgent care clinic since your last visit? Hospitalized since your last visit? \" No    2. \"Have you seen or consulted any other health care providers outside of the [address] [place] [address] since your last visit? \" No     3. For patients aged 39-70: Has the patient had a colonoscopy / FIT/ Cologuard? No      If the patient is female:    4. For patients aged 41-77: Has the patient had a mammogram within the past 2 years? Yes - no Care Gap present      5. For patients aged 21-65: Has the patient had a pap smear?  No

## 2023-03-07 LAB
ALBUMIN SERPL-MCNC: 4.9 G/DL (ref 3.8–4.9)
ALBUMIN/CREAT UR: <5 MG/G CREAT (ref 0–29)
ALBUMIN/GLOB SERPL: 1.9 {RATIO} (ref 1.2–2.2)
ALP SERPL-CCNC: 95 IU/L (ref 44–121)
ALT SERPL-CCNC: 26 IU/L (ref 0–32)
AST SERPL-CCNC: 15 IU/L (ref 0–40)
BILIRUB SERPL-MCNC: 0.3 MG/DL (ref 0–1.2)
BUN SERPL-MCNC: 14 MG/DL (ref 6–24)
BUN/CREAT SERPL: 18 (ref 9–23)
CALCIUM SERPL-MCNC: 10.8 MG/DL (ref 8.7–10.2)
CHLORIDE SERPL-SCNC: 100 MMOL/L (ref 96–106)
CHOLEST SERPL-MCNC: 160 MG/DL (ref 100–199)
CO2 SERPL-SCNC: 27 MMOL/L (ref 20–29)
CREAT SERPL-MCNC: 0.77 MG/DL (ref 0.57–1)
CREAT UR-MCNC: 58.3 MG/DL
EGFRCR SERPLBLD CKD-EPI 2021: 92 ML/MIN/1.73
ERYTHROCYTE [DISTWIDTH] IN BLOOD BY AUTOMATED COUNT: 13.7 % (ref 11.7–15.4)
EST. AVERAGE GLUCOSE BLD GHB EST-MCNC: 166 MG/DL
GLOBULIN SER CALC-MCNC: 2.6 G/DL (ref 1.5–4.5)
GLUCOSE SERPL-MCNC: 121 MG/DL (ref 70–99)
HBA1C MFR BLD: 7.4 % (ref 4.8–5.6)
HCT VFR BLD AUTO: 45.1 % (ref 34–46.6)
HDLC SERPL-MCNC: 47 MG/DL
HGB BLD-MCNC: 14.7 G/DL (ref 11.1–15.9)
IMP & REVIEW OF LAB RESULTS: NORMAL
LDLC SERPL CALC-MCNC: 91 MG/DL (ref 0–99)
MCH RBC QN AUTO: 29.2 PG (ref 26.6–33)
MCHC RBC AUTO-ENTMCNC: 32.6 G/DL (ref 31.5–35.7)
MCV RBC AUTO: 90 FL (ref 79–97)
MICROALBUMIN UR-MCNC: <3 UG/ML
PLATELET # BLD AUTO: 267 X10E3/UL (ref 150–450)
POTASSIUM SERPL-SCNC: 4.2 MMOL/L (ref 3.5–5.2)
PROT SERPL-MCNC: 7.5 G/DL (ref 6–8.5)
RBC # BLD AUTO: 5.04 X10E6/UL (ref 3.77–5.28)
SODIUM SERPL-SCNC: 142 MMOL/L (ref 134–144)
TRIGL SERPL-MCNC: 125 MG/DL (ref 0–149)
TSH SERPL DL<=0.005 MIU/L-ACNC: 1.83 UIU/ML (ref 0.45–4.5)
VLDLC SERPL CALC-MCNC: 22 MG/DL (ref 5–40)
WBC # BLD AUTO: 9.2 X10E3/UL (ref 3.4–10.8)

## 2023-03-08 NOTE — PROGRESS NOTES
Calcium has increased significantly, please recheck in 1-2 weeks with additional tests to assess calcium regulation. Diabetic control has improved - remains slightly above goal. Please continue on current medications and recheck in 3 months. All other labs are within normal limits.    Please inform

## 2023-03-09 DIAGNOSIS — E83.52 SERUM CALCIUM ELEVATED: Primary | ICD-10-CM

## 2023-03-14 ENCOUNTER — HOSPITAL ENCOUNTER (OUTPATIENT)
Dept: NUCLEAR MEDICINE | Age: 53
Discharge: HOME | End: 2023-03-14
Attending: FAMILY MEDICINE
Payer: MEDICAID

## 2023-03-14 DIAGNOSIS — E11.40 TYPE 2 DIABETES MELLITUS WITH DIABETIC NEUROPATHY, WITHOUT LONG-TERM CURRENT USE OF INSULIN (HCC): ICD-10-CM

## 2023-03-14 DIAGNOSIS — R14.0 ABDOMINAL BLOATING: ICD-10-CM

## 2023-03-14 PROCEDURE — 78264 GASTRIC EMPTYING IMG STUDY: CPT

## 2023-03-14 RX ORDER — TECHNETIUM TC 99M SULFUR COLLOID 2 MG
0.53 KIT MISCELLANEOUS
Status: COMPLETED | OUTPATIENT
Start: 2023-03-14 | End: 2023-03-14

## 2023-03-14 RX ADMIN — TECHNETIUM TC 99M SULFUR COLLOID 0.53 MILLICURIE: KIT at 08:00

## 2023-03-17 DIAGNOSIS — E11.40 TYPE 2 DIABETES MELLITUS WITH DIABETIC NEUROPATHY, WITHOUT LONG-TERM CURRENT USE OF INSULIN (HCC): ICD-10-CM

## 2023-03-17 RX ORDER — GLIPIZIDE 10 MG/1
TABLET ORAL
Qty: 180 TABLET | Refills: 2 | Status: SHIPPED | OUTPATIENT
Start: 2023-03-17

## 2023-04-18 DIAGNOSIS — I10 PRIMARY HYPERTENSION: ICD-10-CM

## 2023-04-18 RX ORDER — LISINOPRIL AND HYDROCHLOROTHIAZIDE 20; 25 MG/1; MG/1
1 TABLET ORAL DAILY
Qty: 90 TABLET | Refills: 2 | Status: SHIPPED | OUTPATIENT
Start: 2023-04-18

## 2023-06-29 RX ORDER — ESCITALOPRAM OXALATE 20 MG/1
TABLET ORAL DAILY
Qty: 90 TABLET | Refills: 2 | Status: SHIPPED | OUTPATIENT
Start: 2023-06-29

## 2025-07-01 ENCOUNTER — OFFICE VISIT (OUTPATIENT)
Age: 55
End: 2025-07-01
Payer: MEDICAID

## 2025-07-01 VITALS
SYSTOLIC BLOOD PRESSURE: 138 MMHG | DIASTOLIC BLOOD PRESSURE: 83 MMHG | OXYGEN SATURATION: 98 % | HEART RATE: 99 BPM | BODY MASS INDEX: 23.16 KG/M2 | WEIGHT: 139 LBS | TEMPERATURE: 98.1 F | HEIGHT: 65 IN | RESPIRATION RATE: 19 BRPM

## 2025-07-01 DIAGNOSIS — I10 PRIMARY HYPERTENSION: Primary | ICD-10-CM

## 2025-07-01 DIAGNOSIS — E78.00 PURE HYPERCHOLESTEROLEMIA: ICD-10-CM

## 2025-07-01 DIAGNOSIS — G89.4 CHRONIC PAIN SYNDROME: ICD-10-CM

## 2025-07-01 DIAGNOSIS — E11.65 TYPE 2 DIABETES MELLITUS WITH HYPERGLYCEMIA, WITHOUT LONG-TERM CURRENT USE OF INSULIN (HCC): ICD-10-CM

## 2025-07-01 PROCEDURE — 3079F DIAST BP 80-89 MM HG: CPT

## 2025-07-01 PROCEDURE — 99214 OFFICE O/P EST MOD 30 MIN: CPT

## 2025-07-01 PROCEDURE — 3075F SYST BP GE 130 - 139MM HG: CPT

## 2025-07-01 RX ORDER — METHOCARBAMOL 500 MG/1
500 TABLET, FILM COATED ORAL 4 TIMES DAILY PRN
COMMUNITY
Start: 2025-06-29 | End: 2025-07-04

## 2025-07-01 RX ORDER — HYDROCHLOROTHIAZIDE 25 MG/1
25 TABLET ORAL DAILY
COMMUNITY
Start: 2025-06-27 | End: 2025-07-11

## 2025-07-01 RX ORDER — LISINOPRIL AND HYDROCHLOROTHIAZIDE 20; 25 MG/1; MG/1
1 TABLET ORAL DAILY
Qty: 90 TABLET | Refills: 0 | Status: SHIPPED | OUTPATIENT
Start: 2025-07-01

## 2025-07-01 RX ORDER — ROSUVASTATIN CALCIUM 20 MG/1
20 TABLET, COATED ORAL NIGHTLY
Qty: 90 TABLET | Refills: 0 | Status: SHIPPED | OUTPATIENT
Start: 2025-07-01

## 2025-07-01 RX ORDER — METFORMIN HYDROCHLORIDE 500 MG/1
TABLET, EXTENDED RELEASE ORAL
Qty: 360 TABLET | Refills: 0 | Status: SHIPPED | OUTPATIENT
Start: 2025-07-01 | End: 2025-10-06

## 2025-07-01 RX ORDER — GLUCOSAMINE HCL/CHONDROITIN SU 500-400 MG
CAPSULE ORAL
Qty: 300 STRIP | Refills: 3 | Status: SHIPPED | OUTPATIENT
Start: 2025-07-01

## 2025-07-01 RX ORDER — GABAPENTIN 300 MG/1
300 CAPSULE ORAL 2 TIMES DAILY
Qty: 180 CAPSULE | Refills: 0 | Status: SHIPPED | OUTPATIENT
Start: 2025-07-01 | End: 2025-09-29

## 2025-07-01 RX ORDER — LISINOPRIL 20 MG/1
20 TABLET ORAL DAILY
COMMUNITY
Start: 2025-06-29 | End: 2025-07-13

## 2025-07-01 SDOH — ECONOMIC STABILITY: FOOD INSECURITY: WITHIN THE PAST 12 MONTHS, YOU WORRIED THAT YOUR FOOD WOULD RUN OUT BEFORE YOU GOT MONEY TO BUY MORE.: NEVER TRUE

## 2025-07-01 SDOH — ECONOMIC STABILITY: FOOD INSECURITY: WITHIN THE PAST 12 MONTHS, THE FOOD YOU BOUGHT JUST DIDN'T LAST AND YOU DIDN'T HAVE MONEY TO GET MORE.: NEVER TRUE

## 2025-07-01 ASSESSMENT — PATIENT HEALTH QUESTIONNAIRE - PHQ9
SUM OF ALL RESPONSES TO PHQ QUESTIONS 1-9: 0
1. LITTLE INTEREST OR PLEASURE IN DOING THINGS: NOT AT ALL
2. FEELING DOWN, DEPRESSED OR HOPELESS: NOT AT ALL
SUM OF ALL RESPONSES TO PHQ QUESTIONS 1-9: 0

## 2025-07-01 NOTE — PATIENT INSTRUCTIONS
Take the metformin 500mg tabs as follows:    1 tab twice a day x 1 week, THEN  2 tabs in the morning and 1 tab in the evening x 1 week, THEN  2 tabs twice a day    Follow-up in 3 months to re-check your A1c. We will be adjusting your medications closely to get back to what you were previously taking.

## 2025-07-01 NOTE — ASSESSMENT & PLAN NOTE
Chronic, not at goal (unstable), plan to increase metformin to 1 g twice a day.  Follow closely in 3 months and we will more than likely restart glipizide 10 mg daily at that time

## 2025-07-01 NOTE — ASSESSMENT & PLAN NOTE
Chronic, not at goal (unstable), PDMP reviewed.  Controlled substance contract signed and scanned into chart.  UDS ordered today.  Increase gabapentin to 300 mg twice a day for the next 90 days.  Patient planning on establishing with pain management

## 2025-07-01 NOTE — PROGRESS NOTES
CC:  Chief Complaint   Patient presents with    Follow-Up from Hospital     Diabetes sciatica acting up, in alot of pain, sugars been getting high.       HPI:  Alpa Helton is a 54 y.o. year old female who presents to the clinic for follow-up. She was last seen in the office on 3/6/23 with Dr. Pearson.    She was seen in the [facility] ER on 6/29/25 for sciatica and hyperglycemia. She was also seen for medication refills of her medications. Labs, IVF, insulin provided during ER visit. MRI lumbar performed with results \"early facet arthropathy, no acute disc herniation, significant stenosis or neural encroachment.\"    After chart review, patient has ongoing chronic hyperlipidemia, type 2 diabetes, hypertension, chronic pain syndrome. She has been out of her medications for the last 2 years and re-started some of them yesterday after getting a 30-day supply from the ER.    HLD-was previously on rosuvastatin 20 mg daily, has been out of her medication for the last 2 years but restarted yesterday  [id number] previously on metformin 1 g twice daily, glipizide 10 mg twice daily, Jardiance 25 mg daily.  Has been out of her medications for 2 years, just restarted metformin 500 mg daily.  She checks her blood sugar at home and needs a refill of her test strips  HTN-was previously on lisinopril-HCTZ 20-25mg daily, refill ordered. She is normotensive in the office today.  Denies chest pain  Chronic pain-she was previously on gabapentin 600 mg 3 times daily but has been off of it for some time now.  When she went to the ER, they refilled her for 100 mg 3 times daily.  She feels that this is not controlling her pain well.  She was referred to pain management but would like to increase her gabapentin today.      Reviewed PmHx, RxHx, FmHx, SocHx, AllgHx and updated in chart.    ROS:  General:  Denies headache  Opthalmologic:  Denies blurred vision, changes in vision  Respiratory:  Denies shortness of breath,

## 2025-07-01 NOTE — PROGRESS NOTES
The patient identity was confirmed with  and First/Last Name. Medications and Allergies reviewed with patient, as well as any new diagnosis/procedures. Depression Screening and SDOH Screening done today.    Chief Complaint   Patient presents with    Follow-Up from Hospital     Diabetes sciatica acting up, in alot of pain, sugars been getting high.        Vitals:    25 0823   BP: 138/83   Pulse: 99   Resp: 19   Temp: 98.1 °F (36.7 °C)   SpO2: 98%       Health Maintenance Due   Topic Date Due    Depression Screen  Never done    HIV screen  Never done    Diabetic retinal exam  Never done    Hepatitis B vaccine (1 of 3 - 19+ 3-dose series) Never done    DTaP/Tdap/Td vaccine (2 - Td or Tdap) 2021    Diabetic foot exam  2023    Pneumococcal 50+ years Vaccine (2 of 2 - PCV) 2023    A1C test (Diabetic or Prediabetic)  2024    Diabetic Alb to Cr ratio (uACR) test  2024    Lipids  2024    GFR test (Diabetes, CKD 3-4, OR last GFR 15-59)  2024    Colorectal Cancer Screen  2024    Breast cancer screen  2024    COVID-19 Vaccine ( season) 2024          \"Have you been to the ER, urgent care clinic since your last visit?  Hospitalized since your last visit?\"    NO- Reason for Visit    “Have you seen or consulted any other health care providers outside our system since your last visit?”    NO    Have you had a mammogram?”   NO    Date of last Mammogram: 2022       “Have you had a colorectal cancer screening such as a colonoscopy/FIT/Cologuard?    NO    Date of last Colonoscopy: 2014  No cologuard on file  No FIT/FOBT on file   No flexible sigmoidoscopy on file     “Have you had a diabetic eye exam?”    NO     No diabetic eye exam on file

## 2025-07-03 ENCOUNTER — RESULTS FOLLOW-UP (OUTPATIENT)
Age: 55
End: 2025-07-03

## 2025-07-03 DIAGNOSIS — E11.65 TYPE 2 DIABETES MELLITUS WITH HYPERGLYCEMIA, WITHOUT LONG-TERM CURRENT USE OF INSULIN (HCC): Primary | ICD-10-CM

## 2025-07-03 LAB
AMPHET UR QL SCN: POSITIVE
BARBITURATES UR QL SCN: NEGATIVE
BENZODIAZ UR QL: NEGATIVE
CANNABINOIDS UR QL SCN: POSITIVE
CHOLEST SERPL-MCNC: 263 MG/DL
COCAINE UR QL SCN: NEGATIVE
EST. AVERAGE GLUCOSE BLD GHB EST-MCNC: 332 MG/DL
HBA1C MFR BLD: 13.2 % (ref 4–5.6)
HDLC SERPL-MCNC: 38 MG/DL
HDLC SERPL: 6.9 (ref 0–5)
LDLC SERPL CALC-MCNC: 189.4 MG/DL (ref 0–100)
Lab: ABNORMAL
METHADONE UR QL: NEGATIVE
OPIATES UR QL: NEGATIVE
PCP UR QL: NEGATIVE
TRIGL SERPL-MCNC: 178 MG/DL
VLDLC SERPL CALC-MCNC: 35.6 MG/DL

## 2025-07-03 RX ORDER — GLIPIZIDE 10 MG/1
10 TABLET ORAL 2 TIMES DAILY
Qty: 60 TABLET | Refills: 3 | Status: SHIPPED | OUTPATIENT
Start: 2025-07-03

## 2025-08-04 ENCOUNTER — OFFICE VISIT (OUTPATIENT)
Age: 55
End: 2025-08-04
Payer: MEDICAID

## 2025-08-04 VITALS
BODY MASS INDEX: 22.56 KG/M2 | SYSTOLIC BLOOD PRESSURE: 108 MMHG | HEIGHT: 65 IN | WEIGHT: 135.4 LBS | OXYGEN SATURATION: 97 % | DIASTOLIC BLOOD PRESSURE: 74 MMHG | HEART RATE: 108 BPM | RESPIRATION RATE: 16 BRPM

## 2025-08-04 DIAGNOSIS — E11.65 TYPE 2 DIABETES MELLITUS WITH HYPERGLYCEMIA, WITHOUT LONG-TERM CURRENT USE OF INSULIN (HCC): Primary | ICD-10-CM

## 2025-08-04 DIAGNOSIS — G89.29 OTHER CHRONIC PAIN: ICD-10-CM

## 2025-08-04 DIAGNOSIS — I10 PRIMARY HYPERTENSION: ICD-10-CM

## 2025-08-04 PROCEDURE — 3074F SYST BP LT 130 MM HG: CPT | Performed by: FAMILY MEDICINE

## 2025-08-04 PROCEDURE — 3078F DIAST BP <80 MM HG: CPT | Performed by: FAMILY MEDICINE

## 2025-08-04 PROCEDURE — 99214 OFFICE O/P EST MOD 30 MIN: CPT | Performed by: FAMILY MEDICINE

## 2025-08-04 PROCEDURE — 3046F HEMOGLOBIN A1C LEVEL >9.0%: CPT | Performed by: FAMILY MEDICINE

## 2025-08-04 RX ORDER — AVOBENZONE, HOMOSALATE, OCTISALATE, OCTOCRYLENE 30; 40; 45; 26 MG/ML; MG/ML; MG/ML; MG/ML
1 CREAM TOPICAL DAILY
Qty: 100 EACH | Refills: 11 | Status: SHIPPED | OUTPATIENT
Start: 2025-08-04

## 2025-08-04 RX ORDER — BLOOD-GLUCOSE METER
KIT MISCELLANEOUS
Qty: 1 KIT | Refills: 0 | Status: SHIPPED | OUTPATIENT
Start: 2025-08-04

## 2025-08-04 RX ORDER — GABAPENTIN 600 MG/1
600 TABLET ORAL 3 TIMES DAILY
Qty: 270 TABLET | Refills: 1 | Status: SHIPPED | OUTPATIENT
Start: 2025-08-04 | End: 2026-01-31

## 2025-08-04 RX ORDER — GLUCOSAMINE HCL/CHONDROITIN SU 500-400 MG
CAPSULE ORAL
Qty: 100 STRIP | Refills: 11 | Status: SHIPPED | OUTPATIENT
Start: 2025-08-04

## 2025-08-04 RX ORDER — LIDOCAINE 50 MG/G
1 PATCH TOPICAL DAILY
COMMUNITY

## 2025-09-02 ENCOUNTER — TELEPHONE (OUTPATIENT)
Age: 55
End: 2025-09-02

## 2025-09-02 DIAGNOSIS — G89.29 OTHER CHRONIC PAIN: Primary | ICD-10-CM
